# Patient Record
Sex: FEMALE | URBAN - METROPOLITAN AREA
[De-identification: names, ages, dates, MRNs, and addresses within clinical notes are randomized per-mention and may not be internally consistent; named-entity substitution may affect disease eponyms.]

---

## 2017-02-07 ENCOUNTER — INPATIENT (INPATIENT)
Facility: HOSPITAL | Age: 82
LOS: 9 days | Discharge: NURSING, REHAB OR LONG-TERM CARE | DRG: 871 | End: 2017-02-17
Attending: INTERNAL MEDICINE | Admitting: INTERNAL MEDICINE
Payer: MEDICARE

## 2017-02-07 VITALS
OXYGEN SATURATION: 96 % | SYSTOLIC BLOOD PRESSURE: 140 MMHG | HEART RATE: 84 BPM | TEMPERATURE: 98 F | RESPIRATION RATE: 14 BRPM | DIASTOLIC BLOOD PRESSURE: 60 MMHG

## 2017-02-07 DIAGNOSIS — F32.9 MAJOR DEPRESSIVE DISORDER, SINGLE EPISODE, UNSPECIFIED: ICD-10-CM

## 2017-02-07 DIAGNOSIS — K52.9 NONINFECTIVE GASTROENTERITIS AND COLITIS, UNSPECIFIED: ICD-10-CM

## 2017-02-07 DIAGNOSIS — N32.81 OVERACTIVE BLADDER: ICD-10-CM

## 2017-02-07 DIAGNOSIS — D72.829 ELEVATED WHITE BLOOD CELL COUNT, UNSPECIFIED: ICD-10-CM

## 2017-02-07 DIAGNOSIS — R82.90 UNSPECIFIED ABNORMAL FINDINGS IN URINE: ICD-10-CM

## 2017-02-07 DIAGNOSIS — J18.9 PNEUMONIA, UNSPECIFIED ORGANISM: ICD-10-CM

## 2017-02-07 DIAGNOSIS — R53.1 WEAKNESS: ICD-10-CM

## 2017-02-07 DIAGNOSIS — Z41.8 ENCOUNTER FOR OTHER PROCEDURES FOR PURPOSES OTHER THAN REMEDYING HEALTH STATE: ICD-10-CM

## 2017-02-07 DIAGNOSIS — R19.5 OTHER FECAL ABNORMALITIES: ICD-10-CM

## 2017-02-07 LAB
ALBUMIN SERPL ELPH-MCNC: 2.8 G/DL — LOW (ref 3.3–5)
ALP SERPL-CCNC: 57 U/L — SIGNIFICANT CHANGE UP (ref 40–120)
ALT FLD-CCNC: 9 U/L — LOW (ref 12–78)
ANION GAP SERPL CALC-SCNC: 9 MMOL/L — SIGNIFICANT CHANGE UP (ref 5–17)
ANISOCYTOSIS BLD QL: SLIGHT — SIGNIFICANT CHANGE UP
APPEARANCE UR: ABNORMAL
APTT BLD: 29.9 SEC — SIGNIFICANT CHANGE UP (ref 27.5–37.4)
AST SERPL-CCNC: 19 U/L — SIGNIFICANT CHANGE UP (ref 15–37)
BACTERIA # UR AUTO: ABNORMAL
BILIRUB SERPL-MCNC: 0.5 MG/DL — SIGNIFICANT CHANGE UP (ref 0.2–1.2)
BILIRUB UR-MCNC: NEGATIVE — SIGNIFICANT CHANGE UP
BUN SERPL-MCNC: 28 MG/DL — HIGH (ref 7–23)
CALCIUM SERPL-MCNC: 9.1 MG/DL — SIGNIFICANT CHANGE UP (ref 8.5–10.1)
CHLORIDE SERPL-SCNC: 102 MMOL/L — SIGNIFICANT CHANGE UP (ref 96–108)
CK MB BLD-MCNC: 1.4 % — SIGNIFICANT CHANGE UP (ref 0–3.5)
CK MB CFR SERPL CALC: 0.9 NG/ML — SIGNIFICANT CHANGE UP (ref 0–3.6)
CK SERPL-CCNC: 64 U/L — SIGNIFICANT CHANGE UP (ref 26–192)
CO2 SERPL-SCNC: 28 MMOL/L — SIGNIFICANT CHANGE UP (ref 22–31)
COLOR SPEC: YELLOW — SIGNIFICANT CHANGE UP
COMMENT - URINE: SIGNIFICANT CHANGE UP
CREAT SERPL-MCNC: 0.94 MG/DL — SIGNIFICANT CHANGE UP (ref 0.5–1.3)
DIFF PNL FLD: ABNORMAL
GLUCOSE SERPL-MCNC: 101 MG/DL — HIGH (ref 70–99)
GLUCOSE UR QL: NEGATIVE — SIGNIFICANT CHANGE UP
HCT VFR BLD CALC: 37.5 % — SIGNIFICANT CHANGE UP (ref 34.5–45)
HGB BLD-MCNC: 12.5 G/DL — SIGNIFICANT CHANGE UP (ref 11.5–15.5)
HYPOCHROMIA BLD QL: SLIGHT — SIGNIFICANT CHANGE UP
INR BLD: 1.37 RATIO — HIGH (ref 0.88–1.16)
KETONES UR-MCNC: NEGATIVE — SIGNIFICANT CHANGE UP
LACTATE SERPL-SCNC: 1.2 MMOL/L — SIGNIFICANT CHANGE UP (ref 0.7–2)
LEUKOCYTE ESTERASE UR-ACNC: ABNORMAL
LIDOCAIN IGE QN: 50 U/L — LOW (ref 73–393)
LYMPHOCYTES # BLD AUTO: 6 % — LOW (ref 13–44)
MACROCYTES BLD QL: SLIGHT — SIGNIFICANT CHANGE UP
MCHC RBC-ENTMCNC: 32 PG — SIGNIFICANT CHANGE UP (ref 27–34)
MCHC RBC-ENTMCNC: 33.4 GM/DL — SIGNIFICANT CHANGE UP (ref 32–36)
MCV RBC AUTO: 95.9 FL — SIGNIFICANT CHANGE UP (ref 80–100)
MONOCYTES NFR BLD AUTO: 4 % — SIGNIFICANT CHANGE UP (ref 1–9)
NEUTROPHILS NFR BLD AUTO: 63 % — SIGNIFICANT CHANGE UP (ref 43–77)
NEUTS BAND # BLD: 25 % — HIGH (ref 0–8)
NITRITE UR-MCNC: POSITIVE
OB PNL STL: POSITIVE
PH UR: 6 — SIGNIFICANT CHANGE UP (ref 4.8–8)
PLAT MORPH BLD: NORMAL — SIGNIFICANT CHANGE UP
PLATELET # BLD AUTO: 213 K/UL — SIGNIFICANT CHANGE UP (ref 150–400)
POIKILOCYTOSIS BLD QL AUTO: SLIGHT — SIGNIFICANT CHANGE UP
POLYCHROMASIA BLD QL SMEAR: SLIGHT — SIGNIFICANT CHANGE UP
POTASSIUM SERPL-MCNC: 3.5 MMOL/L — SIGNIFICANT CHANGE UP (ref 3.5–5.3)
POTASSIUM SERPL-SCNC: 3.5 MMOL/L — SIGNIFICANT CHANGE UP (ref 3.5–5.3)
PROT SERPL-MCNC: 7 G/DL — SIGNIFICANT CHANGE UP (ref 6–8.3)
PROT UR-MCNC: NEGATIVE — SIGNIFICANT CHANGE UP
PROTHROM AB SERPL-ACNC: 15.3 SEC — HIGH (ref 10–13.1)
RBC # BLD: 3.91 M/UL — SIGNIFICANT CHANGE UP (ref 3.8–5.2)
RBC # FLD: 13.3 % — SIGNIFICANT CHANGE UP (ref 10.3–14.5)
RBC BLD AUTO: ABNORMAL
RBC CASTS # UR COMP ASSIST: SIGNIFICANT CHANGE UP /HPF (ref 0–4)
SODIUM SERPL-SCNC: 139 MMOL/L — SIGNIFICANT CHANGE UP (ref 135–145)
SP GR SPEC: 1.01 — SIGNIFICANT CHANGE UP (ref 1.01–1.02)
TROPONIN I SERPL-MCNC: 0.02 NG/ML — SIGNIFICANT CHANGE UP (ref 0.01–0.04)
UROBILINOGEN FLD QL: NEGATIVE — SIGNIFICANT CHANGE UP
VARIANT LYMPHS # BLD: 2 % — SIGNIFICANT CHANGE UP (ref 0–6)
WBC # BLD: 18.8 K/UL — HIGH (ref 3.8–10.5)
WBC # FLD AUTO: 18.8 K/UL — HIGH (ref 3.8–10.5)
WBC UR QL: SIGNIFICANT CHANGE UP

## 2017-02-07 PROCEDURE — 99285 EMERGENCY DEPT VISIT HI MDM: CPT

## 2017-02-07 PROCEDURE — 70450 CT HEAD/BRAIN W/O DYE: CPT | Mod: 26

## 2017-02-07 PROCEDURE — 74177 CT ABD & PELVIS W/CONTRAST: CPT | Mod: 26

## 2017-02-07 PROCEDURE — 71010: CPT | Mod: 26

## 2017-02-07 PROCEDURE — 99223 1ST HOSP IP/OBS HIGH 75: CPT | Mod: AI,GC

## 2017-02-07 PROCEDURE — 93010 ELECTROCARDIOGRAM REPORT: CPT

## 2017-02-07 RX ORDER — ACETAMINOPHEN 500 MG
650 TABLET ORAL EVERY 6 HOURS
Qty: 0 | Refills: 0 | Status: DISCONTINUED | OUTPATIENT
Start: 2017-02-07 | End: 2017-02-17

## 2017-02-07 RX ORDER — SODIUM CHLORIDE 9 MG/ML
1000 INJECTION INTRAMUSCULAR; INTRAVENOUS; SUBCUTANEOUS
Qty: 0 | Refills: 0 | Status: DISCONTINUED | OUTPATIENT
Start: 2017-02-07 | End: 2017-02-07

## 2017-02-07 RX ORDER — SODIUM CHLORIDE 9 MG/ML
1000 INJECTION INTRAMUSCULAR; INTRAVENOUS; SUBCUTANEOUS
Qty: 0 | Refills: 0 | Status: DISCONTINUED | OUTPATIENT
Start: 2017-02-07 | End: 2017-02-08

## 2017-02-07 RX ORDER — AZITHROMYCIN 500 MG/1
500 TABLET, FILM COATED ORAL EVERY 24 HOURS
Qty: 0 | Refills: 0 | Status: DISCONTINUED | OUTPATIENT
Start: 2017-02-07 | End: 2017-02-07

## 2017-02-07 RX ORDER — PIPERACILLIN AND TAZOBACTAM 4; .5 G/20ML; G/20ML
3.38 INJECTION, POWDER, LYOPHILIZED, FOR SOLUTION INTRAVENOUS EVERY 8 HOURS
Qty: 0 | Refills: 0 | Status: DISCONTINUED | OUTPATIENT
Start: 2017-02-07 | End: 2017-02-07

## 2017-02-07 RX ORDER — SODIUM CHLORIDE 9 MG/ML
1000 INJECTION INTRAMUSCULAR; INTRAVENOUS; SUBCUTANEOUS ONCE
Qty: 0 | Refills: 0 | Status: COMPLETED | OUTPATIENT
Start: 2017-02-07 | End: 2017-02-07

## 2017-02-07 RX ORDER — AZITHROMYCIN 500 MG/1
500 TABLET, FILM COATED ORAL ONCE
Qty: 0 | Refills: 0 | Status: COMPLETED | OUTPATIENT
Start: 2017-02-07 | End: 2017-02-07

## 2017-02-07 RX ORDER — ASPIRIN/CALCIUM CARB/MAGNESIUM 324 MG
0 TABLET ORAL
Qty: 0 | Refills: 0 | COMMUNITY

## 2017-02-07 RX ORDER — ONDANSETRON 8 MG/1
4 TABLET, FILM COATED ORAL EVERY 6 HOURS
Qty: 0 | Refills: 0 | Status: DISCONTINUED | OUTPATIENT
Start: 2017-02-07 | End: 2017-02-17

## 2017-02-07 RX ORDER — PIPERACILLIN AND TAZOBACTAM 4; .5 G/20ML; G/20ML
3.38 INJECTION, POWDER, LYOPHILIZED, FOR SOLUTION INTRAVENOUS ONCE
Qty: 0 | Refills: 0 | Status: COMPLETED | OUTPATIENT
Start: 2017-02-07 | End: 2017-02-07

## 2017-02-07 RX ORDER — METRONIDAZOLE 500 MG
500 TABLET ORAL ONCE
Qty: 0 | Refills: 0 | Status: COMPLETED | OUTPATIENT
Start: 2017-02-07 | End: 2017-02-07

## 2017-02-07 RX ORDER — ENOXAPARIN SODIUM 100 MG/ML
40 INJECTION SUBCUTANEOUS EVERY 24 HOURS
Qty: 0 | Refills: 0 | Status: DISCONTINUED | OUTPATIENT
Start: 2017-02-07 | End: 2017-02-08

## 2017-02-07 RX ORDER — SODIUM CHLORIDE 9 MG/ML
3 INJECTION INTRAMUSCULAR; INTRAVENOUS; SUBCUTANEOUS ONCE
Qty: 0 | Refills: 0 | Status: COMPLETED | OUTPATIENT
Start: 2017-02-07 | End: 2017-02-07

## 2017-02-07 RX ORDER — METRONIDAZOLE 500 MG
500 TABLET ORAL EVERY 8 HOURS
Qty: 0 | Refills: 0 | Status: DISCONTINUED | OUTPATIENT
Start: 2017-02-07 | End: 2017-02-12

## 2017-02-07 RX ORDER — PANTOPRAZOLE SODIUM 20 MG/1
40 TABLET, DELAYED RELEASE ORAL EVERY 12 HOURS
Qty: 0 | Refills: 0 | Status: DISCONTINUED | OUTPATIENT
Start: 2017-02-07 | End: 2017-02-17

## 2017-02-07 RX ORDER — CEFTRIAXONE 500 MG/1
1 INJECTION, POWDER, FOR SOLUTION INTRAMUSCULAR; INTRAVENOUS ONCE
Qty: 0 | Refills: 0 | Status: COMPLETED | OUTPATIENT
Start: 2017-02-07 | End: 2017-02-07

## 2017-02-07 RX ORDER — ACETAMINOPHEN 500 MG
650 TABLET ORAL ONCE
Qty: 0 | Refills: 0 | Status: COMPLETED | OUTPATIENT
Start: 2017-02-07 | End: 2017-02-07

## 2017-02-07 RX ADMIN — Medication 100 MILLIGRAM(S): at 19:53

## 2017-02-07 RX ADMIN — PIPERACILLIN AND TAZOBACTAM 200 GRAM(S): 4; .5 INJECTION, POWDER, LYOPHILIZED, FOR SOLUTION INTRAVENOUS at 17:00

## 2017-02-07 RX ADMIN — Medication 650 MILLIGRAM(S): at 13:01

## 2017-02-07 RX ADMIN — ENOXAPARIN SODIUM 40 MILLIGRAM(S): 100 INJECTION SUBCUTANEOUS at 19:52

## 2017-02-07 RX ADMIN — SODIUM CHLORIDE 100 MILLILITER(S): 9 INJECTION INTRAMUSCULAR; INTRAVENOUS; SUBCUTANEOUS at 22:45

## 2017-02-07 RX ADMIN — SODIUM CHLORIDE 125 MILLILITER(S): 9 INJECTION INTRAMUSCULAR; INTRAVENOUS; SUBCUTANEOUS at 13:01

## 2017-02-07 RX ADMIN — SODIUM CHLORIDE 3 MILLILITER(S): 9 INJECTION INTRAMUSCULAR; INTRAVENOUS; SUBCUTANEOUS at 12:24

## 2017-02-07 RX ADMIN — CEFTRIAXONE 100 GRAM(S): 500 INJECTION, POWDER, FOR SOLUTION INTRAMUSCULAR; INTRAVENOUS at 13:01

## 2017-02-07 RX ADMIN — SODIUM CHLORIDE 1000 MILLILITER(S): 9 INJECTION INTRAMUSCULAR; INTRAVENOUS; SUBCUTANEOUS at 11:35

## 2017-02-07 RX ADMIN — AZITHROMYCIN 255 MILLIGRAM(S): 500 TABLET, FILM COATED ORAL at 18:22

## 2017-02-07 NOTE — H&P ADULT. - ATTENDING COMMENTS
cc lower abdo pain with loose stools x 1 day   HPI (Essential HTN in the past, off HTN meds given hypotension, Hx of TIA no CVA, OA on alleve, depression)   88 yo female with one day of lower abdo pain (discomfort) with loose stools dark watery 3-4 times with large amount prior to ED arrival.   pain not radiating to the back, no flank pain no dyuria no urgency no frequency. no recent weight loss, no change in po dit intake, no pain with eating, pain relieves eases up, but then has episodes of explosive loose stools.     no recent sick contacts, no travel, no take out, no colonscopy, no hx of diverticulitis, no colon ca, no hx of radiation   no hx of recent abx use. not on immunesuppresive agents   patient does use alleve daily for OA.   no chest pain no sob, no cough no CAREY no le edema no PND no orthopnea  no hx of MI, PVD, Stroke, no IDDM II.   CT ap done in the ED found to have SB enteritis, with fever 101, and leukocytosis 18 with bandemia.   given iv empiric broad spectrum abx and IV fluids.   PMH, PSH, Social, fam hx, med list reviewed.   ICU Vital Signs Last 24 Hrs  T(C): 36.8, Max: 38.3 (02-07 @ 10:35)  T(F): 98.2, Max: 101 (02-07 @ 10:35)  HR: 80 (80 - 94)  BP: 132/68 (128/66 - 140/60)  BP(mean): --  ABP: --  ABP(mean): --  RR: 16 (14 - 20)  SpO2: 96% (95% - 96%)  Gen looks well a xo x 3, speaking full sentences   heent no lo   pulm good ae bi, no wheezing bi   cvs rrr s1-s2 no murmurs no edema le bi, no jvd   abdo soft, reduced BS, mild ttp at the lower abdo, mid > lt/rt nd no flank pain   labs reviewed and analyzed   prior medical hx reviewed and analyzed.   CT ap imaging reports reviewed and personally viewed no bowel obstruction (IV non contrast, PO contrast noted)   CXR reports reviewed and personally viewed.  FOBT (+)   A/P:   Signs and symptoms consistent with acute infectious diarrhea SB enteritis with ? dark blood, viral rotavirus, adenovirus, CMV etc, r/o bacterial shigella, Campylobacter, less likely salmonella, r/o c diff   ddx NSAID induced enteropathy patient is on alleve and takes daily for OA   DDX less likely mesenteric ischemia.   NPO   IV fluids NS 100cc/hr x 1 liter then reassess clincially.  start PPI as well given NSAID hx   start Levaquin and Flagyl IV   If not improved, consider GI consultation for egd, colonoscopy, DBE and or VCE   High risk for morbidity, mortality, secondary to the above mentioned co-existing co-morbid medical conditions   reviewed with patient and dtr plan of care cc lower abdo pain with loose stools x 1 day   HPI (Essential HTN in the past, off HTN meds given hypotension, Hx of TIA no CVA, OA on alleve, depression)   86 yo female with one day of lower abdo pain (discomfort) with loose stools dark watery 3-4 times with large amount prior to ED arrival.   pain not radiating to the back, no flank pain no dyuria no urgency no frequency. no recent weight loss, no change in po dit intake, no pain with eating, pain relieves eases up, but then has episodes of explosive loose stools.     no recent sick contacts, no travel, no take out, no colonscopy, no hx of diverticulitis, no colon ca, no hx of radiation   no hx of recent abx use. not on immunesuppresive agents, no Hx of IBD    patient does use alleve daily for OA.   no chest pain no sob, no cough no CAREY no le edema no PND no orthopnea  no hx of MI, PVD, Stroke, no IDDM II.   CT ap done in the ED found to have SB enteritis, with fever 101, and leukocytosis 18 with bandemia.   given iv empiric broad spectrum abx and IV fluids.   PMH, PSH, Social, fam hx, med list reviewed.   ICU Vital Signs Last 24 Hrs  T(C): 36.8, Max: 38.3 (02-07 @ 10:35)  T(F): 98.2, Max: 101 (02-07 @ 10:35)  HR: 80 (80 - 94)  BP: 132/68 (128/66 - 140/60)  BP(mean): --  ABP: --  ABP(mean): --  RR: 16 (14 - 20)  SpO2: 96% (95% - 96%)  Gen looks well a xo x 3, speaking full sentences   heent no lo   pulm good ae bi, no wheezing bi   cvs rrr s1-s2 no murmurs no edema le bi, no jvd   abdo soft, reduced BS, mild ttp at the lower abdo, mid > lt/rt nd no flank pain   labs reviewed and analyzed   prior medical hx reviewed and analyzed.   CT ap imaging reports reviewed and personally viewed no bowel obstruction (IV non contrast, PO contrast noted)   CXR reports reviewed and personally viewed.  FOBT (+)   A/P:   Signs and symptoms consistent with acute infectious diarrhea SB enteritis with ? dark blood, viral rotavirus, adenovirus, CMV etc, r/o bacterial shigella, Campylobacter, less likely salmonella, r/o c diff   ddx NSAID induced enteropathy patient is on alleve and takes daily for OA   DDX less likely mesenteric ischemia.   NPO   IV fluids NS 100cc/hr x 1 liter then reassess clincially.  start PPI as well given NSAID hx   start Levaquin and Flagyl IV   If not improved, consider GI consultation for egd, colonoscopy, DBE and or VCE   High risk for morbidity, mortality, secondary to the above mentioned co-existing co-morbid medical conditions   reviewed with patient and dtr plan of care cc lower abdo pain with loose stools x 1 day   HPI (Essential HTN in the past, off HTN meds given hypotension, Hx of TIA no CVA, OA on alleve, depression)   86 yo female with one day of lower abdo pain (discomfort) with loose stools dark watery 3-4 times with large amount prior to ED arrival.   pain not radiating to the back, no flank pain no dyuria no urgency no frequency. no recent weight loss, no change in po dit intake, no pain with eating, pain relieves eases up, but then has episodes of explosive loose stools.     no recent sick contacts, no travel, no take out, no colonscopy, no hx of diverticulitis, no colon ca, no hx of radiation   no hx of recent abx use. not on immunesuppresive agents, no Hx of IBD    patient does use alleve daily for OA.   no chest pain no sob, no cough no CAREY no le edema no PND no orthopnea  no hx of MI, PVD, Stroke, no IDDM II.   CT ap done in the ED found to have SB enteritis, with fever 101, and leukocytosis 18 with bandemia.   given iv empiric broad spectrum abx and IV fluids.   PMH, PSH, Social, fam hx, med list reviewed.   ICU Vital Signs Last 24 Hrs  T(C): 36.8, Max: 38.3 (02-07 @ 10:35)  T(F): 98.2, Max: 101 (02-07 @ 10:35)  HR: 80 (80 - 94)  BP: 132/68 (128/66 - 140/60)  BP(mean): --  ABP: --  ABP(mean): --  RR: 16 (14 - 20)  SpO2: 96% (95% - 96%)  Gen looks well a xo x 3, speaking full sentences   heent no lo   pulm good ae bi, no wheezing bi   cvs rrr s1-s2 no murmurs no edema le bi, no jvd   abdo soft, reduced BS, mild ttp at the lower abdo, mid > lt/rt nd no flank pain   labs reviewed and analyzed   prior medical hx reviewed and analyzed.   CT ap imaging reports reviewed and personally viewed no bowel obstruction (IV non contrast, PO contrast noted)   CXR reports reviewed and personally viewed.  FOBT (+)   A/P:   Signs and symptoms consistent with acute infectious diarrhea SB enteritis with ? dark blood, viral rotavirus, adenovirus, CMV etc, r/o bacterial shigella, Campylobacter, less likely salmonella, r/o c diff   ddx NSAID induced enteropathy patient is on alleve and takes daily for OA   DDX less likely mesenteric ischemia.   NPO   IV fluids NS 100cc/hr x 1 liter then reassess clincially.  start PPI as well given NSAID hx   Avoid NSAID use  start Levaquin and Flagyl IV   If not improved, consider GI consultation for egd, colonoscopy, DBE and or VCE   High risk for morbidity, mortality, secondary to the above mentioned co-existing co-morbid medical conditions   reviewed with patient and dtr plan of care cc lower abdo pain with loose stools x 1 day   HPI (Essential HTN in the past, off HTN meds given hypotension, Hx of TIA no CVA, OA on alleve, depression)   88 yo female with one day of lower abdo pain (discomfort) with loose stools dark watery 3-4 times with large amount prior to ED arrival.   pain not radiating to the back, no flank pain no dyuria no urgency no frequency. no recent weight loss, no change in po dit intake, no pain with eating, pain relieves eases up, but then has episodes of explosive loose stools.     no recent sick contacts, no travel, no take out, no colonscopy, no hx of diverticulitis, no colon ca, no hx of radiation   no hx of recent abx use. not on immunesuppresive agents, no Hx of IBD    patient does use alleve daily for OA.   no chest pain no sob, no cough no CAREY no le edema no PND no orthopnea  no hx of MI, PVD, Stroke, no IDDM II.   CT ap done in the ED found to have SB enteritis, with fever 101, and leukocytosis 18 with bandemia.   given iv empiric broad spectrum abx and IV fluids.   PMH, PSH, Social, fam hx, med list reviewed.   ICU Vital Signs Last 24 Hrs  T(C): 36.8, Max: 38.3 (02-07 @ 10:35)  T(F): 98.2, Max: 101 (02-07 @ 10:35)  HR: 80 (80 - 94)  BP: 132/68 (128/66 - 140/60)  BP(mean): --  ABP: --  ABP(mean): --  RR: 16 (14 - 20)  SpO2: 96% (95% - 96%)  Gen looks well a xo x 3, speaking full sentences   heent no lo   pulm good ae bi, no wheezing bi   cvs rrr s1-s2 no murmurs no edema le bi, no jvd   abdo soft, reduced BS, mild ttp at the lower abdo, mid > lt/rt nd no flank pain   labs reviewed and analyzed   prior medical hx reviewed and analyzed.   CT ap imaging reports reviewed and personally viewed no bowel obstruction (IV non contrast, PO contrast noted)   CXR reports reviewed and personally viewed.  FOBT (+)   A/P:   Signs and symptoms consistent with acute infectious diarrhea SB enteritis with ? dark blood, viral rotavirus, adenovirus, CMV etc, r/o bacterial shigella, Campylobacter, less likely salmonella, r/o c diff   ddx NSAID induced enteropathy patient is on alleve and takes daily for OA   DDX less likely mesenteric ischemia.   NPO   IV fluids NS 100cc/hr x 1 liter then reassess clincially.  start PPI Protonix 40 mg IV bid -given NSAID hx   Avoid NSAID use  start Levaquin and Flagyl IV   If not improved, consider GI consultation for egd, colonoscopy, DBE and or VCE   High risk for morbidity, mortality, secondary to the above mentioned co-existing co-morbid medical conditions   reviewed with patient and dtr plan of care cc lower abdo pain with loose stools x 1 day   HPI (Essential HTN in the past, off HTN meds given hypotension, Hx of TIA no CVA, OA on alleve, depression)   86 yo female with one day of lower abdo pain (discomfort) with loose stools dark watery 3-4 times with large amount prior to ED arrival.   pain not radiating to the back, no flank pain no dyuria no urgency no frequency. no recent weight loss, no change in po dit intake, no pain with eating, pain relieves eases up, but then has episodes of explosive loose stools.     no recent sick contacts, no travel, no take out, no colonscopy, no hx of diverticulitis, no colon ca, no hx of radiation   no hx of recent abx use. not on immunesuppresive agents, no Hx of IBD    patient does use alleve daily for OA.   no chest pain no sob, no cough no CAREY no le edema no PND no orthopnea  no hx of MI, PVD, Stroke, no IDDM II.   CT ap done in the ED found to have SB enteritis, with fever 101, and leukocytosis 18 with bandemia.   given iv empiric broad spectrum abx and IV fluids.   PMH, PSH, Social, fam hx, med list reviewed.   ICU Vital Signs Last 24 Hrs  T(C): 36.8, Max: 38.3 (02-07 @ 10:35)  T(F): 98.2, Max: 101 (02-07 @ 10:35)  HR: 80 (80 - 94)  BP: 132/68 (128/66 - 140/60)  BP(mean): --  ABP: --  ABP(mean): --  RR: 16 (14 - 20)  SpO2: 96% (95% - 96%)  Gen looks well a xo x 3, speaking full sentences   heent no lo   pulm good ae bi, no wheezing bi   cvs rrr s1-s2 no murmurs no edema le bi, no jvd   abdo soft, reduced BS, mild ttp at the lower abdo, mid > lt/rt nd no flank pain   labs reviewed and analyzed   prior medical hx reviewed and analyzed.   CT ap imaging reports reviewed and personally viewed no bowel obstruction (IV non contrast, PO contrast noted)   CXR reports reviewed and personally viewed.  FOBT (+)   A/P:   Signs and symptoms consistent with acute infectious diarrhea SB enteritis with ? dark blood, viral rotavirus, adenovirus, CMV etc, r/o bacterial shigella, Campylobacter, less likely salmonella, r/o c diff   ddx NSAID induced enteropathy patient is on alleve and takes daily for OA   DDX less likely mesenteric ischemia.   NPO   IV fluids NS 100cc/hr x 1 liter then reassess clincially.  start PPI Protonix 40 mg IV bid -given NSAID hx   Avoid NSAID use  start Levaquin and Flagyl IV   If not improved, consider GI consultation for egd, colonoscopy, DBE and or VCE   (not likely PNA, UTI clinically, will monitor clinically over time)   High risk for morbidity, mortality, secondary to the above mentioned co-existing co-morbid medical conditions   reviewed with patient and dtr plan of care

## 2017-02-07 NOTE — ED PROVIDER NOTE - OBJECTIVE STATEMENT
Pt is a 88 yo f who lives at home with children. hx of pelvic fx freq falls heavy smoker tia no surgery pmd =[facility]. was in baseline health until yest when she had explosive diarrhea. today she was very weak and could not get oob. was even hard to speak. lkw was 6pm last matthew (tpa discussion held with pt and family) this am pt had red urine could not sit up so daughter called 911 to bring pt to er.

## 2017-02-07 NOTE — H&P ADULT. - ASSESSMENT
87F PMHx depression, over active bladder, COPD (not on maintenance meds), HTN (no longer needing meds), alternating constipation and diarrhea admitted for weakness and colitis with ?PNA and ?UTI.

## 2017-02-07 NOTE — H&P ADULT. - PROBLEM SELECTOR PLAN 5
-Reported 1 bloody BM. No current signs of active bleeding.   -If no symptomatic improvement consider GI consult for possible NSAID induced enteritis.   -F/u T&S

## 2017-02-07 NOTE — H&P ADULT. - PMH
COPD (chronic obstructive pulmonary disease)    Depression, unspecified depression type    H/O TIA (transient ischemic attack) and stroke  ? history of  OAB (overactive bladder)

## 2017-02-07 NOTE — H&P ADULT. - PROBLEM SELECTOR PLAN 6
Likely 2/2 #1, 2 and 5.   -Continue Antibiotics  -Monitor CBCs.  -Out of Bed with assistance  -Fall precautions

## 2017-02-07 NOTE — ED ADULT NURSE REASSESSMENT NOTE - NS ED NURSE REASSESS COMMENT FT1
await room cleaning, changed for contact precautions r/o c-diff.
pt straight cath'd for ua, c/s. pt taken for CT

## 2017-02-07 NOTE — H&P ADULT. - RS GEN PE MLT RESP DETAILS PC
respirations non-labored/breath sounds equal/no rhonchi/good air movement/no rales/coarse breath sounds diffusely/no wheezes

## 2017-02-07 NOTE — PATIENT PROFILE ADULT. - REASON FOR ADMISSION
Patient came TO ER due to being unable to get OOB and having diarrhea since yesturday and pain in abdomen

## 2017-02-07 NOTE — H&P ADULT. - PROBLEM SELECTOR PLAN 1
-Continue Flagyl and Levaquin. -  NS @ 100  -F/u cultures, stool studies (ova, parasites, C. Diff)  -Contact precautions until stool studies resulted.   -Tylenol PRN  -Clear Liquid diet

## 2017-02-07 NOTE — H&P ADULT. - HISTORY OF PRESENT ILLNESS
87F PMHx depression, over active bladder, COPD (not on maintenance meds), HTN (no longer needing meds), alternating constipation and diarrhea presenting with weakness and abdominal pain.  History obtained from daughter and patient. Pt had not been feeling herself for the past 3 days, more tired and weak with a hoarse voice c/o crampy abdominal and back pain. Pt had multiple episodes of diarrhea day prior to admission, too numerous to count, pt was incontinent of stool, 1 episode of blood in stool, but all others brown without blood. Pt took unknown anti-diarrheal medication x3, diarrhea stopped. When patient awoke this morning she was too weak to get out of bed so daughter took her to the ER. Admits to decreased appetite over the past few days. Denies fever, new cough, sore throat, nausea, vomiting, sick contacts, SOB or chest pain.     In ED T 101 87F PMHx depression, over active bladder, COPD (not on maintenance meds), HTN (no longer needing meds), alternating constipation and diarrhea presenting with weakness and abdominal pain.  History obtained from daughter and patient. Pt had not been feeling herself for the past 3 days, more tired and weak with a hoarse voice c/o crampy abdominal and back pain. Pt had multiple episodes of diarrhea day prior to admission, too numerous to count, pt was incontinent of stool, 1 episode of blood in stool, but all others brown without blood. Pt took unknown anti-diarrheal medication x3, diarrhea stopped. When patient awoke this morning she was too weak to get out of bed so daughter took her to the ER. Admits to decreased appetite over the past few days. Denies fever, new cough, sore throat, nausea, vomiting, sick contacts, SOB or chest pain.     In ED T 101F, WBC 18, 20% bands. UA +leuk esterase, +nitrite, many bacteria. FOBT+.   CXR: Asymmetric increased density seen throughout the left hemithorax compatible with left-sided airspace disease, possibly PNA.   CT A/P: Enteritis infectious vs inflammatory, Dependent B/L LL airspace disease with superimposed nodular left lower lobe opacities, possible PNA. Possible infectious or inflammatory cystitis. Nodular hepatic surface requiring further evaluation for cirrhosis. Severe compression deformity of L1 vertebral body, age indeterminate.   Multilevel disc osteophyte complexes.   CT Head: negative for acute bleed    Pt admitted for weakness, with colitis and ?PNA and ?UTI. 87F PMHx depression, over active bladder, COPD (not on maintenance meds), HTN (no longer needing meds), alternating constipation and diarrhea presenting with weakness and abdominal pain.  History obtained from daughter and patient. Pt had not been feeling herself for the past 3 days, more tired and weak with a hoarse voice c/o crampy abdominal and back pain. Pt had multiple episodes of diarrhea day prior to admission, too numerous to count, pt was incontinent of stool, 1 episode of blood in stool, but all others brown without blood. Pt took unknown anti-diarrheal medication x3, diarrhea stopped. When patient awoke this morning she was too weak to get out of bed so daughter took her to the ER. Admits to decreased appetite over the past few days. Denies fever, new cough, sore throat, nausea, vomiting, sick contacts, SOB or chest pain.     In ED T 101F, WBC 18, 20% bands. UA +leuk esterase, +nitrite, many bacteria. FOBT+.    CXR: Asymmetric increased density seen throughout the left hemithorax compatible with left-sided airspace disease, possibly PNA.   CT A/P: Enteritis infectious vs inflammatory, Dependent B/L LL airspace disease with superimposed nodular left lower lobe opacities, possible PNA. Possible infectious or inflammatory cystitis. Nodular hepatic surface requiring further evaluation for cirrhosis. Severe compression deformity of L1 vertebral body, age indeterminate.   Multilevel disc osteophyte complexes.   CT Head: negative for acute bleed  Received 1 dose of Rocephin.     Pt admitted for weakness, with colitis and ?PNA and ?UTI.

## 2017-02-08 LAB
ALBUMIN SERPL ELPH-MCNC: 2.3 G/DL — LOW (ref 3.3–5)
ALP SERPL-CCNC: 62 U/L — SIGNIFICANT CHANGE UP (ref 40–120)
ALT FLD-CCNC: 10 U/L — LOW (ref 12–78)
ANION GAP SERPL CALC-SCNC: 10 MMOL/L — SIGNIFICANT CHANGE UP (ref 5–17)
AST SERPL-CCNC: 30 U/L — SIGNIFICANT CHANGE UP (ref 15–37)
BASOPHILS # BLD AUTO: 0.1 K/UL — SIGNIFICANT CHANGE UP (ref 0–0.2)
BASOPHILS NFR BLD AUTO: 0.3 % — SIGNIFICANT CHANGE UP (ref 0–2)
BILIRUB SERPL-MCNC: 0.4 MG/DL — SIGNIFICANT CHANGE UP (ref 0.2–1.2)
BUN SERPL-MCNC: 22 MG/DL — SIGNIFICANT CHANGE UP (ref 7–23)
CALCIUM SERPL-MCNC: 8.4 MG/DL — LOW (ref 8.5–10.1)
CHLORIDE SERPL-SCNC: 109 MMOL/L — HIGH (ref 96–108)
CO2 SERPL-SCNC: 24 MMOL/L — SIGNIFICANT CHANGE UP (ref 22–31)
CREAT SERPL-MCNC: 0.74 MG/DL — SIGNIFICANT CHANGE UP (ref 0.5–1.3)
EOSINOPHIL # BLD AUTO: 0.2 K/UL — SIGNIFICANT CHANGE UP (ref 0–0.5)
EOSINOPHIL NFR BLD AUTO: 1.1 % — SIGNIFICANT CHANGE UP (ref 0–6)
GLUCOSE SERPL-MCNC: 101 MG/DL — HIGH (ref 70–99)
HCT VFR BLD CALC: 32.6 % — LOW (ref 34.5–45)
HCT VFR BLD CALC: 33.9 % — LOW (ref 34.5–45)
HGB BLD-MCNC: 10.6 G/DL — LOW (ref 11.5–15.5)
HGB BLD-MCNC: 10.8 G/DL — LOW (ref 11.5–15.5)
LYMPHOCYTES # BLD AUTO: 1.1 K/UL — SIGNIFICANT CHANGE UP (ref 1–3.3)
LYMPHOCYTES # BLD AUTO: 6.6 % — LOW (ref 13–44)
MCHC RBC-ENTMCNC: 30.9 PG — SIGNIFICANT CHANGE UP (ref 27–34)
MCHC RBC-ENTMCNC: 32 GM/DL — SIGNIFICANT CHANGE UP (ref 32–36)
MCV RBC AUTO: 96.6 FL — SIGNIFICANT CHANGE UP (ref 80–100)
MONOCYTES # BLD AUTO: 1.1 K/UL — HIGH (ref 0–0.9)
MONOCYTES NFR BLD AUTO: 6.3 % — SIGNIFICANT CHANGE UP (ref 1–9)
NEUTROPHILS # BLD AUTO: 14.7 K/UL — HIGH (ref 1.8–7.4)
NEUTROPHILS NFR BLD AUTO: 85.6 % — HIGH (ref 43–77)
PLATELET # BLD AUTO: 177 K/UL — SIGNIFICANT CHANGE UP (ref 150–400)
POTASSIUM SERPL-MCNC: 3.2 MMOL/L — LOW (ref 3.5–5.3)
POTASSIUM SERPL-SCNC: 3.2 MMOL/L — LOW (ref 3.5–5.3)
PROT SERPL-MCNC: 5.9 G/DL — LOW (ref 6–8.3)
RBC # BLD: 3.51 M/UL — LOW (ref 3.8–5.2)
RBC # FLD: 13.2 % — SIGNIFICANT CHANGE UP (ref 10.3–14.5)
SODIUM SERPL-SCNC: 143 MMOL/L — SIGNIFICANT CHANGE UP (ref 135–145)
WBC # BLD: 17.1 K/UL — HIGH (ref 3.8–10.5)
WBC # FLD AUTO: 17.1 K/UL — HIGH (ref 3.8–10.5)

## 2017-02-08 PROCEDURE — 99233 SBSQ HOSP IP/OBS HIGH 50: CPT | Mod: GC

## 2017-02-08 RX ORDER — POTASSIUM CHLORIDE 20 MEQ
40 PACKET (EA) ORAL EVERY 4 HOURS
Qty: 0 | Refills: 0 | Status: COMPLETED | OUTPATIENT
Start: 2017-02-08 | End: 2017-02-08

## 2017-02-08 RX ORDER — SODIUM CHLORIDE 9 MG/ML
1000 INJECTION INTRAMUSCULAR; INTRAVENOUS; SUBCUTANEOUS
Qty: 0 | Refills: 0 | Status: DISCONTINUED | OUTPATIENT
Start: 2017-02-08 | End: 2017-02-09

## 2017-02-08 RX ORDER — CIPROFLOXACIN LACTATE 400MG/40ML
400 VIAL (ML) INTRAVENOUS EVERY 12 HOURS
Qty: 0 | Refills: 0 | Status: DISCONTINUED | OUTPATIENT
Start: 2017-02-08 | End: 2017-02-12

## 2017-02-08 RX ORDER — NICOTINE POLACRILEX 2 MG
1 GUM BUCCAL DAILY
Qty: 0 | Refills: 0 | Status: DISCONTINUED | OUTPATIENT
Start: 2017-02-08 | End: 2017-02-08

## 2017-02-08 RX ORDER — OXYBUTYNIN CHLORIDE 5 MG
5 TABLET ORAL
Qty: 0 | Refills: 0 | Status: DISCONTINUED | OUTPATIENT
Start: 2017-02-08 | End: 2017-02-17

## 2017-02-08 RX ORDER — LACTOBACILLUS ACIDOPHILUS 100MM CELL
1 CAPSULE ORAL
Qty: 0 | Refills: 0 | Status: DISCONTINUED | OUTPATIENT
Start: 2017-02-08 | End: 2017-02-17

## 2017-02-08 RX ORDER — OXYBUTYNIN CHLORIDE 5 MG
5 TABLET ORAL
Qty: 0 | Refills: 0 | Status: DISCONTINUED | OUTPATIENT
Start: 2017-02-08 | End: 2017-02-08

## 2017-02-08 RX ORDER — NICOTINE POLACRILEX 2 MG
1 GUM BUCCAL DAILY
Qty: 0 | Refills: 0 | Status: DISCONTINUED | OUTPATIENT
Start: 2017-02-08 | End: 2017-02-09

## 2017-02-08 RX ADMIN — Medication 100 MILLIGRAM(S): at 01:15

## 2017-02-08 RX ADMIN — Medication 40 MILLIEQUIVALENT(S): at 12:09

## 2017-02-08 RX ADMIN — PANTOPRAZOLE SODIUM 40 MILLIGRAM(S): 20 TABLET, DELAYED RELEASE ORAL at 17:19

## 2017-02-08 RX ADMIN — Medication 20 MILLIGRAM(S): at 12:11

## 2017-02-08 RX ADMIN — PANTOPRAZOLE SODIUM 40 MILLIGRAM(S): 20 TABLET, DELAYED RELEASE ORAL at 06:51

## 2017-02-08 RX ADMIN — Medication 200 MILLIGRAM(S): at 17:19

## 2017-02-08 RX ADMIN — Medication 5 MILLIGRAM(S): at 12:11

## 2017-02-08 RX ADMIN — Medication 100 MILLIGRAM(S): at 08:43

## 2017-02-08 RX ADMIN — Medication 40 MILLIEQUIVALENT(S): at 08:43

## 2017-02-08 RX ADMIN — Medication 5 MILLIGRAM(S): at 17:19

## 2017-02-08 RX ADMIN — Medication 1 PATCH: at 12:12

## 2017-02-08 RX ADMIN — Medication 1 TABLET(S): at 14:46

## 2017-02-08 RX ADMIN — Medication 100 MILLIGRAM(S): at 17:18

## 2017-02-08 RX ADMIN — Medication 1 TABLET(S): at 17:19

## 2017-02-08 NOTE — DIETITIAN INITIAL EVALUATION ADULT. - NUTRITION INTERVENTION
Medical Food Supplements/Meals and Snack/Nutrition Education/Vitamin/Nutrition - Related Medication Management

## 2017-02-08 NOTE — DIETITIAN INITIAL EVALUATION ADULT. - OTHER INFO
Pt awake/alert at time of visit; daughter at bedside. Dx colitis/gastroenteritis. On IVF. Tolerating low fiber, low na diet well w/ fair appetite/po intake at present. No report N/V. Last BM 2/6 (diarrhea w/ blood noted). No further BM since. Hx contsipation. Takes miralax, prune juice pta. Follows regular diet pta. Drinks ensure daily plus activia yogurt daily for optimal bowel regularity. Wt stable.

## 2017-02-08 NOTE — DIETITIAN INITIAL EVALUATION ADULT. - NS AS NUTRI INTERV MEDICAL AND FOOD SUPPLEMENTS
Recommend ensure enlive 8oz po daily (vanilla) to help meet estimated kcal/pro needs./Commercial beverage

## 2017-02-08 NOTE — DIETITIAN INITIAL EVALUATION ADULT. - NS AS NUTRI INTERV ED CONTENT
Written and verbal instructions provided to pt/pts daughter regarding low fiber nutrition therapy. Good understanding of material discussed. RDs name/phone number left with patient if questions/concerns arise.

## 2017-02-09 LAB
-  AMIKACIN: SIGNIFICANT CHANGE UP
-  AMPICILLIN/SULBACTAM: SIGNIFICANT CHANGE UP
-  AMPICILLIN: SIGNIFICANT CHANGE UP
-  AZTREONAM: SIGNIFICANT CHANGE UP
-  CEFAZOLIN: SIGNIFICANT CHANGE UP
-  CEFEPIME: SIGNIFICANT CHANGE UP
-  CEFOXITIN: SIGNIFICANT CHANGE UP
-  CEFTAZIDIME: SIGNIFICANT CHANGE UP
-  CEFTRIAXONE: SIGNIFICANT CHANGE UP
-  CIPROFLOXACIN: SIGNIFICANT CHANGE UP
-  ERTAPENEM: SIGNIFICANT CHANGE UP
-  GENTAMICIN: SIGNIFICANT CHANGE UP
-  IMIPENEM: SIGNIFICANT CHANGE UP
-  LEVOFLOXACIN: SIGNIFICANT CHANGE UP
-  MEROPENEM: SIGNIFICANT CHANGE UP
-  NITROFURANTOIN: SIGNIFICANT CHANGE UP
-  PIPERACILLIN/TAZOBACTAM: SIGNIFICANT CHANGE UP
-  TOBRAMYCIN: SIGNIFICANT CHANGE UP
-  TRIMETHOPRIM/SULFAMETHOXAZOLE: SIGNIFICANT CHANGE UP
ALBUMIN SERPL ELPH-MCNC: 2.4 G/DL — LOW (ref 3.3–5)
ALP SERPL-CCNC: 71 U/L — SIGNIFICANT CHANGE UP (ref 40–120)
ALT FLD-CCNC: 14 U/L — SIGNIFICANT CHANGE UP (ref 12–78)
ANION GAP SERPL CALC-SCNC: 10 MMOL/L — SIGNIFICANT CHANGE UP (ref 5–17)
AST SERPL-CCNC: 33 U/L — SIGNIFICANT CHANGE UP (ref 15–37)
BASOPHILS # BLD AUTO: 0.1 K/UL — SIGNIFICANT CHANGE UP (ref 0–0.2)
BASOPHILS NFR BLD AUTO: 0.5 % — SIGNIFICANT CHANGE UP (ref 0–2)
BILIRUB SERPL-MCNC: 0.5 MG/DL — SIGNIFICANT CHANGE UP (ref 0.2–1.2)
BUN SERPL-MCNC: 19 MG/DL — SIGNIFICANT CHANGE UP (ref 7–23)
CALCIUM SERPL-MCNC: 8.8 MG/DL — SIGNIFICANT CHANGE UP (ref 8.5–10.1)
CHLORIDE SERPL-SCNC: 110 MMOL/L — HIGH (ref 96–108)
CK MB BLD-MCNC: 2.9 % — SIGNIFICANT CHANGE UP (ref 0–3.5)
CK MB BLD-MCNC: 3.3 % — SIGNIFICANT CHANGE UP (ref 0–3.5)
CK MB CFR SERPL CALC: 6 NG/ML — HIGH (ref 0–3.6)
CK MB CFR SERPL CALC: 6.9 NG/ML — HIGH (ref 0–3.6)
CK SERPL-CCNC: 182 U/L — SIGNIFICANT CHANGE UP (ref 26–192)
CK SERPL-CCNC: 239 U/L — HIGH (ref 26–192)
CO2 SERPL-SCNC: 22 MMOL/L — SIGNIFICANT CHANGE UP (ref 22–31)
CREAT SERPL-MCNC: 0.7 MG/DL — SIGNIFICANT CHANGE UP (ref 0.5–1.3)
CULTURE RESULTS: SIGNIFICANT CHANGE UP
EOSINOPHIL # BLD AUTO: 0.3 K/UL — SIGNIFICANT CHANGE UP (ref 0–0.5)
EOSINOPHIL NFR BLD AUTO: 1.8 % — SIGNIFICANT CHANGE UP (ref 0–6)
GLUCOSE SERPL-MCNC: 120 MG/DL — HIGH (ref 70–99)
HCT VFR BLD CALC: 34.1 % — LOW (ref 34.5–45)
HCT VFR BLD CALC: 35.1 % — SIGNIFICANT CHANGE UP (ref 34.5–45)
HGB BLD-MCNC: 11 G/DL — LOW (ref 11.5–15.5)
HGB BLD-MCNC: 11 G/DL — LOW (ref 11.5–15.5)
LACTATE SERPL-SCNC: 1.3 MMOL/L — SIGNIFICANT CHANGE UP (ref 0.7–2)
LYMPHOCYTES # BLD AUTO: 1.9 K/UL — SIGNIFICANT CHANGE UP (ref 1–3.3)
LYMPHOCYTES # BLD AUTO: 11.9 % — LOW (ref 13–44)
MAGNESIUM SERPL-MCNC: 2 MG/DL — SIGNIFICANT CHANGE UP (ref 1.8–2.4)
MCHC RBC-ENTMCNC: 31.5 PG — SIGNIFICANT CHANGE UP (ref 27–34)
MCHC RBC-ENTMCNC: 32.4 GM/DL — SIGNIFICANT CHANGE UP (ref 32–36)
MCV RBC AUTO: 97.3 FL — SIGNIFICANT CHANGE UP (ref 80–100)
METHOD TYPE: SIGNIFICANT CHANGE UP
MONOCYTES # BLD AUTO: 1.1 K/UL — HIGH (ref 0–0.9)
MONOCYTES NFR BLD AUTO: 6.8 % — SIGNIFICANT CHANGE UP (ref 1–9)
NEUTROPHILS # BLD AUTO: 12.8 K/UL — HIGH (ref 1.8–7.4)
NEUTROPHILS NFR BLD AUTO: 79 % — HIGH (ref 43–77)
ORGANISM # SPEC MICROSCOPIC CNT: SIGNIFICANT CHANGE UP
ORGANISM # SPEC MICROSCOPIC CNT: SIGNIFICANT CHANGE UP
PLATELET # BLD AUTO: 198 K/UL — SIGNIFICANT CHANGE UP (ref 150–400)
POTASSIUM SERPL-MCNC: 4.1 MMOL/L — SIGNIFICANT CHANGE UP (ref 3.5–5.3)
POTASSIUM SERPL-SCNC: 4.1 MMOL/L — SIGNIFICANT CHANGE UP (ref 3.5–5.3)
PROT SERPL-MCNC: 6.4 G/DL — SIGNIFICANT CHANGE UP (ref 6–8.3)
RBC # BLD: 3.5 M/UL — LOW (ref 3.8–5.2)
RBC # FLD: 13.2 % — SIGNIFICANT CHANGE UP (ref 10.3–14.5)
SODIUM SERPL-SCNC: 142 MMOL/L — SIGNIFICANT CHANGE UP (ref 135–145)
SPECIMEN SOURCE: SIGNIFICANT CHANGE UP
T3 SERPL-MCNC: 57 NG/DL — LOW (ref 80–200)
T4 AB SER-ACNC: 5.4 UG/DL — SIGNIFICANT CHANGE UP (ref 4.6–12)
TROPONIN I SERPL-MCNC: 1.46 NG/ML — HIGH (ref 0.01–0.04)
TROPONIN I SERPL-MCNC: 1.62 NG/ML — HIGH (ref 0.01–0.04)
WBC # BLD: 16.2 K/UL — HIGH (ref 3.8–10.5)
WBC # FLD AUTO: 16.2 K/UL — HIGH (ref 3.8–10.5)

## 2017-02-09 PROCEDURE — 93010 ELECTROCARDIOGRAM REPORT: CPT

## 2017-02-09 PROCEDURE — 99233 SBSQ HOSP IP/OBS HIGH 50: CPT | Mod: GC

## 2017-02-09 PROCEDURE — 99223 1ST HOSP IP/OBS HIGH 75: CPT

## 2017-02-09 RX ORDER — HEPARIN SODIUM 5000 [USP'U]/ML
5000 INJECTION INTRAVENOUS; SUBCUTANEOUS EVERY 12 HOURS
Qty: 0 | Refills: 0 | Status: DISCONTINUED | OUTPATIENT
Start: 2017-02-09 | End: 2017-02-13

## 2017-02-09 RX ORDER — NICOTINE POLACRILEX 2 MG
1 GUM BUCCAL DAILY
Qty: 0 | Refills: 0 | Status: DISCONTINUED | OUTPATIENT
Start: 2017-02-09 | End: 2017-02-17

## 2017-02-09 RX ORDER — METOPROLOL TARTRATE 50 MG
25 TABLET ORAL EVERY 8 HOURS
Qty: 0 | Refills: 0 | Status: DISCONTINUED | OUTPATIENT
Start: 2017-02-09 | End: 2017-02-13

## 2017-02-09 RX ORDER — LANOLIN ALCOHOL/MO/W.PET/CERES
1 CREAM (GRAM) TOPICAL
Qty: 0 | Refills: 0 | COMMUNITY

## 2017-02-09 RX ORDER — POTASSIUM PHOSPHATE, MONOBASIC POTASSIUM PHOSPHATE, DIBASIC 236; 224 MG/ML; MG/ML
15 INJECTION, SOLUTION INTRAVENOUS ONCE
Qty: 0 | Refills: 0 | Status: COMPLETED | OUTPATIENT
Start: 2017-02-09 | End: 2017-02-09

## 2017-02-09 RX ADMIN — Medication 100 MILLIGRAM(S): at 08:26

## 2017-02-09 RX ADMIN — Medication 1 TABLET(S): at 08:26

## 2017-02-09 RX ADMIN — Medication 5 MILLIGRAM(S): at 17:11

## 2017-02-09 RX ADMIN — Medication 1 PATCH: at 11:06

## 2017-02-09 RX ADMIN — Medication 1 TABLET(S): at 17:11

## 2017-02-09 RX ADMIN — Medication 25 MILLIGRAM(S): at 14:48

## 2017-02-09 RX ADMIN — Medication 20 MILLIGRAM(S): at 11:06

## 2017-02-09 RX ADMIN — HEPARIN SODIUM 5000 UNIT(S): 5000 INJECTION INTRAVENOUS; SUBCUTANEOUS at 23:48

## 2017-02-09 RX ADMIN — Medication 200 MILLIGRAM(S): at 05:14

## 2017-02-09 RX ADMIN — Medication 5 MILLIGRAM(S): at 05:14

## 2017-02-09 RX ADMIN — Medication 200 MILLIGRAM(S): at 18:32

## 2017-02-09 RX ADMIN — POTASSIUM PHOSPHATE, MONOBASIC POTASSIUM PHOSPHATE, DIBASIC 63.75 MILLIMOLE(S): 236; 224 INJECTION, SOLUTION INTRAVENOUS at 21:41

## 2017-02-09 RX ADMIN — Medication 25 MILLIGRAM(S): at 21:06

## 2017-02-09 RX ADMIN — Medication 1 TABLET(S): at 13:10

## 2017-02-09 RX ADMIN — PANTOPRAZOLE SODIUM 40 MILLIGRAM(S): 20 TABLET, DELAYED RELEASE ORAL at 17:11

## 2017-02-09 RX ADMIN — PANTOPRAZOLE SODIUM 40 MILLIGRAM(S): 20 TABLET, DELAYED RELEASE ORAL at 05:15

## 2017-02-09 RX ADMIN — Medication 100 MILLIGRAM(S): at 01:39

## 2017-02-09 RX ADMIN — Medication 100 MILLIGRAM(S): at 17:30

## 2017-02-09 RX ADMIN — Medication 1 PATCH: at 11:05

## 2017-02-09 NOTE — GOALS OF CARE CONVERSATION - PERSONAL ADVANCE DIRECTIVE - CONVERSATION DETAILS
contacted pt son, Librado, on phone, regarding AD, pt has living will, w dnr, son to contact his sister regarding putting a dnr in place. son to locate hcp in safe deposit box. pt remains full code at present. will discuss further after son talks to his sister. will follow

## 2017-02-10 LAB
ANION GAP SERPL CALC-SCNC: 10 MMOL/L — SIGNIFICANT CHANGE UP (ref 5–17)
BUN SERPL-MCNC: 24 MG/DL — HIGH (ref 7–23)
CALCIUM SERPL-MCNC: 8.6 MG/DL — SIGNIFICANT CHANGE UP (ref 8.5–10.1)
CHLORIDE SERPL-SCNC: 113 MMOL/L — HIGH (ref 96–108)
CO2 SERPL-SCNC: 21 MMOL/L — LOW (ref 22–31)
CREAT SERPL-MCNC: 0.73 MG/DL — SIGNIFICANT CHANGE UP (ref 0.5–1.3)
FERRITIN SERPL-MCNC: 218 NG/ML — HIGH (ref 15–150)
FOLATE SERPL-MCNC: 19.3 NG/ML — SIGNIFICANT CHANGE UP (ref 4.8–24.2)
GLUCOSE SERPL-MCNC: 144 MG/DL — HIGH (ref 70–99)
HCT VFR BLD CALC: 35 % — SIGNIFICANT CHANGE UP (ref 34.5–45)
HGB BLD-MCNC: 11 G/DL — LOW (ref 11.5–15.5)
MAGNESIUM SERPL-MCNC: 2 MG/DL — SIGNIFICANT CHANGE UP (ref 1.8–2.4)
MCHC RBC-ENTMCNC: 30.7 PG — SIGNIFICANT CHANGE UP (ref 27–34)
MCHC RBC-ENTMCNC: 31.4 GM/DL — LOW (ref 32–36)
MCV RBC AUTO: 98 FL — SIGNIFICANT CHANGE UP (ref 80–100)
PLATELET # BLD AUTO: 211 K/UL — SIGNIFICANT CHANGE UP (ref 150–400)
POTASSIUM SERPL-MCNC: 4.8 MMOL/L — SIGNIFICANT CHANGE UP (ref 3.5–5.3)
POTASSIUM SERPL-SCNC: 4.8 MMOL/L — SIGNIFICANT CHANGE UP (ref 3.5–5.3)
RBC # BLD: 3.57 M/UL — LOW (ref 3.8–5.2)
RBC # FLD: 13.6 % — SIGNIFICANT CHANGE UP (ref 10.3–14.5)
SODIUM SERPL-SCNC: 144 MMOL/L — SIGNIFICANT CHANGE UP (ref 135–145)
VIT B12 SERPL-MCNC: >2000 PG/ML — HIGH (ref 243–894)
WBC # BLD: 13.6 K/UL — HIGH (ref 3.8–10.5)
WBC # FLD AUTO: 13.6 K/UL — HIGH (ref 3.8–10.5)

## 2017-02-10 PROCEDURE — 93306 TTE W/DOPPLER COMPLETE: CPT | Mod: 26

## 2017-02-10 PROCEDURE — 99233 SBSQ HOSP IP/OBS HIGH 50: CPT

## 2017-02-10 PROCEDURE — 71010: CPT | Mod: 26

## 2017-02-10 PROCEDURE — 99232 SBSQ HOSP IP/OBS MODERATE 35: CPT

## 2017-02-10 RX ORDER — DIPHENHYDRAMINE HCL 50 MG
25 CAPSULE ORAL ONCE
Qty: 0 | Refills: 0 | Status: DISCONTINUED | OUTPATIENT
Start: 2017-02-10 | End: 2017-02-17

## 2017-02-10 RX ADMIN — Medication 5 MILLIGRAM(S): at 18:06

## 2017-02-10 RX ADMIN — Medication 25 MILLIGRAM(S): at 23:22

## 2017-02-10 RX ADMIN — PANTOPRAZOLE SODIUM 40 MILLIGRAM(S): 20 TABLET, DELAYED RELEASE ORAL at 18:06

## 2017-02-10 RX ADMIN — Medication 100 MILLIGRAM(S): at 01:48

## 2017-02-10 RX ADMIN — Medication 1 PATCH: at 12:29

## 2017-02-10 RX ADMIN — Medication 200 MILLIGRAM(S): at 18:06

## 2017-02-10 RX ADMIN — Medication 5 MILLIGRAM(S): at 05:49

## 2017-02-10 RX ADMIN — Medication 1 TABLET(S): at 18:06

## 2017-02-10 RX ADMIN — PANTOPRAZOLE SODIUM 40 MILLIGRAM(S): 20 TABLET, DELAYED RELEASE ORAL at 05:49

## 2017-02-10 RX ADMIN — Medication 200 MILLIGRAM(S): at 05:49

## 2017-02-10 RX ADMIN — Medication 100 MILLIGRAM(S): at 16:53

## 2017-02-10 RX ADMIN — Medication 25 MILLIGRAM(S): at 13:41

## 2017-02-10 RX ADMIN — Medication 100 MILLIGRAM(S): at 09:21

## 2017-02-10 RX ADMIN — Medication 25 MILLIGRAM(S): at 05:49

## 2017-02-10 RX ADMIN — Medication 1 TABLET(S): at 12:29

## 2017-02-10 RX ADMIN — Medication 20 MILLIGRAM(S): at 12:29

## 2017-02-10 RX ADMIN — Medication 1 TABLET(S): at 08:18

## 2017-02-10 RX ADMIN — Medication 1 PATCH: at 12:28

## 2017-02-10 RX ADMIN — HEPARIN SODIUM 5000 UNIT(S): 5000 INJECTION INTRAVENOUS; SUBCUTANEOUS at 11:15

## 2017-02-10 RX ADMIN — HEPARIN SODIUM 5000 UNIT(S): 5000 INJECTION INTRAVENOUS; SUBCUTANEOUS at 23:22

## 2017-02-10 NOTE — PROVIDER CONTACT NOTE (CRITICAL VALUE NOTIFICATION) - SITUATION
Blood culture collected on 2/09/17, preliminary report on 2/10/17, growth in aerobic bottle is positive for gram positive cocci in chains

## 2017-02-11 LAB
ANION GAP SERPL CALC-SCNC: 9 MMOL/L — SIGNIFICANT CHANGE UP (ref 5–17)
BUN SERPL-MCNC: 25 MG/DL — HIGH (ref 7–23)
CALCIUM SERPL-MCNC: 9 MG/DL — SIGNIFICANT CHANGE UP (ref 8.5–10.1)
CHLORIDE SERPL-SCNC: 111 MMOL/L — HIGH (ref 96–108)
CO2 SERPL-SCNC: 23 MMOL/L — SIGNIFICANT CHANGE UP (ref 22–31)
CREAT SERPL-MCNC: 0.61 MG/DL — SIGNIFICANT CHANGE UP (ref 0.5–1.3)
CULTURE RESULTS: SIGNIFICANT CHANGE UP
GLUCOSE SERPL-MCNC: 119 MG/DL — HIGH (ref 70–99)
HCT VFR BLD CALC: 33.5 % — LOW (ref 34.5–45)
HGB BLD-MCNC: 10.8 G/DL — LOW (ref 11.5–15.5)
MAGNESIUM SERPL-MCNC: 2.2 MG/DL — SIGNIFICANT CHANGE UP (ref 1.8–2.4)
MCHC RBC-ENTMCNC: 31.2 PG — SIGNIFICANT CHANGE UP (ref 27–34)
MCHC RBC-ENTMCNC: 32.4 GM/DL — SIGNIFICANT CHANGE UP (ref 32–36)
MCV RBC AUTO: 96.1 FL — SIGNIFICANT CHANGE UP (ref 80–100)
PHOSPHATE SERPL-MCNC: 1.7 MG/DL — LOW (ref 2.5–4.5)
PLATELET # BLD AUTO: 212 K/UL — SIGNIFICANT CHANGE UP (ref 150–400)
POTASSIUM SERPL-MCNC: 4.3 MMOL/L — SIGNIFICANT CHANGE UP (ref 3.5–5.3)
POTASSIUM SERPL-SCNC: 4.3 MMOL/L — SIGNIFICANT CHANGE UP (ref 3.5–5.3)
RBC # BLD: 3.48 M/UL — LOW (ref 3.8–5.2)
RBC # FLD: 13.2 % — SIGNIFICANT CHANGE UP (ref 10.3–14.5)
SODIUM SERPL-SCNC: 143 MMOL/L — SIGNIFICANT CHANGE UP (ref 135–145)
SPECIMEN SOURCE: SIGNIFICANT CHANGE UP
WBC # BLD: 14 K/UL — HIGH (ref 3.8–10.5)
WBC # FLD AUTO: 14 K/UL — HIGH (ref 3.8–10.5)

## 2017-02-11 PROCEDURE — 99232 SBSQ HOSP IP/OBS MODERATE 35: CPT

## 2017-02-11 PROCEDURE — 99233 SBSQ HOSP IP/OBS HIGH 50: CPT

## 2017-02-11 RX ORDER — ALPRAZOLAM 0.25 MG
0.25 TABLET ORAL ONCE
Qty: 0 | Refills: 0 | Status: DISCONTINUED | OUTPATIENT
Start: 2017-02-11 | End: 2017-02-11

## 2017-02-11 RX ADMIN — Medication 1 TABLET(S): at 12:11

## 2017-02-11 RX ADMIN — HEPARIN SODIUM 5000 UNIT(S): 5000 INJECTION INTRAVENOUS; SUBCUTANEOUS at 11:13

## 2017-02-11 RX ADMIN — Medication 5 MILLIGRAM(S): at 17:02

## 2017-02-11 RX ADMIN — Medication 1 TABLET(S): at 07:54

## 2017-02-11 RX ADMIN — Medication 1 PATCH: at 11:13

## 2017-02-11 RX ADMIN — Medication 25 MILLIGRAM(S): at 13:27

## 2017-02-11 RX ADMIN — Medication 100 MILLIGRAM(S): at 00:16

## 2017-02-11 RX ADMIN — Medication 1 PATCH: at 12:13

## 2017-02-11 RX ADMIN — Medication 25 MILLIGRAM(S): at 22:46

## 2017-02-11 RX ADMIN — Medication 5 MILLIGRAM(S): at 05:32

## 2017-02-11 RX ADMIN — Medication 200 MILLIGRAM(S): at 05:32

## 2017-02-11 RX ADMIN — Medication 100 MILLIGRAM(S): at 17:03

## 2017-02-11 RX ADMIN — Medication 0.25 MILLIGRAM(S): at 22:57

## 2017-02-11 RX ADMIN — PANTOPRAZOLE SODIUM 40 MILLIGRAM(S): 20 TABLET, DELAYED RELEASE ORAL at 17:03

## 2017-02-11 RX ADMIN — Medication 20 MILLIGRAM(S): at 11:13

## 2017-02-11 RX ADMIN — Medication 1 TABLET(S): at 17:02

## 2017-02-11 RX ADMIN — Medication 200 MILLIGRAM(S): at 18:21

## 2017-02-11 RX ADMIN — HEPARIN SODIUM 5000 UNIT(S): 5000 INJECTION INTRAVENOUS; SUBCUTANEOUS at 22:49

## 2017-02-11 RX ADMIN — Medication 25 MILLIGRAM(S): at 05:32

## 2017-02-11 RX ADMIN — Medication 100 MILLIGRAM(S): at 08:33

## 2017-02-11 RX ADMIN — PANTOPRAZOLE SODIUM 40 MILLIGRAM(S): 20 TABLET, DELAYED RELEASE ORAL at 05:32

## 2017-02-11 NOTE — SWALLOW BEDSIDE ASSESSMENT ADULT - ASR SWALLOW ASPIRATION MONITOR
pneumonia/throat clearing/change of breathing pattern/oral hygiene/fever/upper respiratory infection/position upright (90Y)/cough/gurgly voice

## 2017-02-11 NOTE — SWALLOW BEDSIDE ASSESSMENT ADULT - SWALLOW EVAL: RECOMMENDED FEEDING/EATING TECHNIQUES
oral hygiene/maintain upright posture during/after eating for 30 mins/position upright (90 degrees)/crush medication (when feasible)/allow for swallow between intakes

## 2017-02-11 NOTE — SWALLOW BEDSIDE ASSESSMENT ADULT - ORAL PHASE
Delayed oral transit time/Decreased anterior-posterior movement of the bolus Lingual stasis/Stasis in lateral sulci/Delayed oral transit time/Decreased anterior-posterior movement of the bolus

## 2017-02-11 NOTE — SWALLOW BEDSIDE ASSESSMENT ADULT - COMMENTS
Pt A+Ox2, cooperative.  Pt with COPD, baseline cough.  Pt presents with oropharyngeal dysphagia characterized by reduced oral grading, prolonged mastication, latent AP transport, swallow delay.  Overt s/s of aspiration for thin liquids.

## 2017-02-11 NOTE — SWALLOW BEDSIDE ASSESSMENT ADULT - NS SPL SWALLOW CLINIC TRIAL FT
Overt s/s of aspiration noted for thin liquids.  No overt s/s of aspiration noted for nectar thickened liquids

## 2017-02-11 NOTE — SWALLOW BEDSIDE ASSESSMENT ADULT - PHARYNGEAL PHASE
Delayed pharyngeal swallow/Cough post oral intake Decreased laryngeal elevation/Delayed pharyngeal swallow

## 2017-02-12 ENCOUNTER — RESULT REVIEW (OUTPATIENT)
Age: 82
End: 2017-02-12

## 2017-02-12 LAB
ANION GAP SERPL CALC-SCNC: 8 MMOL/L — SIGNIFICANT CHANGE UP (ref 5–17)
BUN SERPL-MCNC: 25 MG/DL — HIGH (ref 7–23)
CALCIUM SERPL-MCNC: 8.6 MG/DL — SIGNIFICANT CHANGE UP (ref 8.5–10.1)
CHLORIDE SERPL-SCNC: 110 MMOL/L — HIGH (ref 96–108)
CO2 SERPL-SCNC: 24 MMOL/L — SIGNIFICANT CHANGE UP (ref 22–31)
CREAT SERPL-MCNC: 0.64 MG/DL — SIGNIFICANT CHANGE UP (ref 0.5–1.3)
CULTURE RESULTS: SIGNIFICANT CHANGE UP
CULTURE RESULTS: SIGNIFICANT CHANGE UP
GLUCOSE SERPL-MCNC: 142 MG/DL — HIGH (ref 70–99)
HCT VFR BLD CALC: 34.5 % — SIGNIFICANT CHANGE UP (ref 34.5–45)
HGB BLD-MCNC: 10.9 G/DL — LOW (ref 11.5–15.5)
LEGIONELLA AG UR QL: NEGATIVE — SIGNIFICANT CHANGE UP
MCHC RBC-ENTMCNC: 31.3 PG — SIGNIFICANT CHANGE UP (ref 27–34)
MCHC RBC-ENTMCNC: 31.7 GM/DL — LOW (ref 32–36)
MCV RBC AUTO: 98.8 FL — SIGNIFICANT CHANGE UP (ref 80–100)
PLATELET # BLD AUTO: 244 K/UL — SIGNIFICANT CHANGE UP (ref 150–400)
POTASSIUM SERPL-MCNC: 4 MMOL/L — SIGNIFICANT CHANGE UP (ref 3.5–5.3)
POTASSIUM SERPL-SCNC: 4 MMOL/L — SIGNIFICANT CHANGE UP (ref 3.5–5.3)
RBC # BLD: 3.49 M/UL — LOW (ref 3.8–5.2)
RBC # FLD: 13.2 % — SIGNIFICANT CHANGE UP (ref 10.3–14.5)
SODIUM SERPL-SCNC: 142 MMOL/L — SIGNIFICANT CHANGE UP (ref 135–145)
SPECIMEN SOURCE: SIGNIFICANT CHANGE UP
SPECIMEN SOURCE: SIGNIFICANT CHANGE UP
WBC # BLD: 13.6 K/UL — HIGH (ref 3.8–10.5)
WBC # FLD AUTO: 13.6 K/UL — HIGH (ref 3.8–10.5)

## 2017-02-12 PROCEDURE — 71250 CT THORAX DX C-: CPT | Mod: 26

## 2017-02-12 PROCEDURE — 99232 SBSQ HOSP IP/OBS MODERATE 35: CPT

## 2017-02-12 PROCEDURE — 99233 SBSQ HOSP IP/OBS HIGH 50: CPT

## 2017-02-12 PROCEDURE — 93010 ELECTROCARDIOGRAM REPORT: CPT

## 2017-02-12 RX ORDER — METRONIDAZOLE 500 MG
500 TABLET ORAL EVERY 8 HOURS
Qty: 0 | Refills: 0 | Status: DISCONTINUED | OUTPATIENT
Start: 2017-02-12 | End: 2017-02-17

## 2017-02-12 RX ADMIN — Medication 25 MILLIGRAM(S): at 06:00

## 2017-02-12 RX ADMIN — Medication 1 PATCH: at 11:22

## 2017-02-12 RX ADMIN — Medication 100 MILLIGRAM(S): at 08:24

## 2017-02-12 RX ADMIN — Medication 100 MILLIGRAM(S): at 00:48

## 2017-02-12 RX ADMIN — Medication 1 TABLET(S): at 12:16

## 2017-02-12 RX ADMIN — Medication 5 MILLIGRAM(S): at 06:00

## 2017-02-12 RX ADMIN — Medication 1 PATCH: at 11:30

## 2017-02-12 RX ADMIN — Medication 200 MILLIGRAM(S): at 06:00

## 2017-02-12 RX ADMIN — Medication 500 MILLIGRAM(S): at 14:00

## 2017-02-12 RX ADMIN — Medication 25 MILLIGRAM(S): at 22:46

## 2017-02-12 RX ADMIN — Medication 1 TABLET(S): at 17:21

## 2017-02-12 RX ADMIN — HEPARIN SODIUM 5000 UNIT(S): 5000 INJECTION INTRAVENOUS; SUBCUTANEOUS at 11:23

## 2017-02-12 RX ADMIN — Medication 500 MILLIGRAM(S): at 22:46

## 2017-02-12 RX ADMIN — PANTOPRAZOLE SODIUM 40 MILLIGRAM(S): 20 TABLET, DELAYED RELEASE ORAL at 17:21

## 2017-02-12 RX ADMIN — Medication 25 MILLIGRAM(S): at 14:00

## 2017-02-12 RX ADMIN — HEPARIN SODIUM 5000 UNIT(S): 5000 INJECTION INTRAVENOUS; SUBCUTANEOUS at 22:46

## 2017-02-12 RX ADMIN — Medication 1 TABLET(S): at 08:24

## 2017-02-12 RX ADMIN — Medication 20 MILLIGRAM(S): at 11:22

## 2017-02-12 RX ADMIN — PANTOPRAZOLE SODIUM 40 MILLIGRAM(S): 20 TABLET, DELAYED RELEASE ORAL at 06:00

## 2017-02-12 RX ADMIN — Medication 5 MILLIGRAM(S): at 17:21

## 2017-02-13 LAB
ALBUMIN FLD-MCNC: 0.9 G/DL — SIGNIFICANT CHANGE UP
ALBUMIN SERPL ELPH-MCNC: 2.3 G/DL — LOW (ref 3.3–5)
ALP SERPL-CCNC: 136 U/L — HIGH (ref 40–120)
ALT FLD-CCNC: 41 U/L — SIGNIFICANT CHANGE UP (ref 12–78)
ANION GAP SERPL CALC-SCNC: 8 MMOL/L — SIGNIFICANT CHANGE UP (ref 5–17)
AST SERPL-CCNC: 30 U/L — SIGNIFICANT CHANGE UP (ref 15–37)
B PERT IGG+IGM PNL SER: ABNORMAL
BILIRUB SERPL-MCNC: 0.3 MG/DL — SIGNIFICANT CHANGE UP (ref 0.2–1.2)
BUN SERPL-MCNC: 22 MG/DL — SIGNIFICANT CHANGE UP (ref 7–23)
CALCIUM SERPL-MCNC: 8.6 MG/DL — SIGNIFICANT CHANGE UP (ref 8.5–10.1)
CHLORIDE SERPL-SCNC: 109 MMOL/L — HIGH (ref 96–108)
CK SERPL-CCNC: 50 U/L — SIGNIFICANT CHANGE UP (ref 26–192)
CO2 SERPL-SCNC: 26 MMOL/L — SIGNIFICANT CHANGE UP (ref 22–31)
COLOR FLD: YELLOW — SIGNIFICANT CHANGE UP
CREAT SERPL-MCNC: 0.7 MG/DL — SIGNIFICANT CHANGE UP (ref 0.5–1.3)
CULTURE RESULTS: SIGNIFICANT CHANGE UP
FLUID INTAKE SUBSTANCE CLASS: SIGNIFICANT CHANGE UP
FLUID SEGMENTED GRANULOCYTES: 43 % — SIGNIFICANT CHANGE UP
GLUCOSE FLD-MCNC: 105 — SIGNIFICANT CHANGE UP
GLUCOSE SERPL-MCNC: 101 MG/DL — HIGH (ref 70–99)
GRAM STN FLD: SIGNIFICANT CHANGE UP
HCT VFR BLD CALC: 36.2 % — SIGNIFICANT CHANGE UP (ref 34.5–45)
HGB BLD-MCNC: 11.7 G/DL — SIGNIFICANT CHANGE UP (ref 11.5–15.5)
INR BLD: 1.76 RATIO — HIGH (ref 0.88–1.16)
LDH SERPL L TO P-CCNC: 116 U/L — SIGNIFICANT CHANGE UP
LDH SERPL L TO P-CCNC: 313 U/L — HIGH (ref 50–242)
LYMPHOCYTES # FLD: 11 % — SIGNIFICANT CHANGE UP
MAGNESIUM SERPL-MCNC: 2.1 MG/DL — SIGNIFICANT CHANGE UP (ref 1.8–2.4)
MCHC RBC-ENTMCNC: 31.8 PG — SIGNIFICANT CHANGE UP (ref 27–34)
MCHC RBC-ENTMCNC: 32.4 GM/DL — SIGNIFICANT CHANGE UP (ref 32–36)
MCV RBC AUTO: 98.2 FL — SIGNIFICANT CHANGE UP (ref 80–100)
MESOTHL CELL # FLD: 5 % — SIGNIFICANT CHANGE UP
MONOS+MACROS # FLD: 37 % — SIGNIFICANT CHANGE UP
OTHER CELLS FLD MANUAL: 4 % — SIGNIFICANT CHANGE UP
PH FLD: 7.42 — SIGNIFICANT CHANGE UP
PHOSPHATE SERPL-MCNC: 2.1 MG/DL — LOW (ref 2.5–4.5)
PLATELET # BLD AUTO: 296 K/UL — SIGNIFICANT CHANGE UP (ref 150–400)
POTASSIUM SERPL-MCNC: 4 MMOL/L — SIGNIFICANT CHANGE UP (ref 3.5–5.3)
POTASSIUM SERPL-SCNC: 4 MMOL/L — SIGNIFICANT CHANGE UP (ref 3.5–5.3)
PROT FLD-MCNC: 1.6 G/DL — SIGNIFICANT CHANGE UP
PROT SERPL-MCNC: 6.2 G/DL — SIGNIFICANT CHANGE UP (ref 6–8.3)
PROTHROM AB SERPL-ACNC: 19.6 SEC — HIGH (ref 10–13.1)
RBC # BLD: 3.68 M/UL — LOW (ref 3.8–5.2)
RBC # FLD: 13.5 % — SIGNIFICANT CHANGE UP (ref 10.3–14.5)
RCV VOL RI: 1000 /UL — HIGH (ref 0–5)
S PNEUM AG UR QL: NEGATIVE — SIGNIFICANT CHANGE UP
SODIUM SERPL-SCNC: 143 MMOL/L — SIGNIFICANT CHANGE UP (ref 135–145)
SPECIMEN SOURCE: SIGNIFICANT CHANGE UP
SPECIMEN SOURCE: SIGNIFICANT CHANGE UP
TOTAL NUCLEATED CELL COUNT, BODY FLUID: 1857 /UL — HIGH (ref 0–5)
TUBE TYPE: SIGNIFICANT CHANGE UP
WBC # BLD: 13.8 K/UL — HIGH (ref 3.8–10.5)
WBC # FLD AUTO: 13.8 K/UL — HIGH (ref 3.8–10.5)

## 2017-02-13 PROCEDURE — 88305 TISSUE EXAM BY PATHOLOGIST: CPT | Mod: 26

## 2017-02-13 PROCEDURE — 99233 SBSQ HOSP IP/OBS HIGH 50: CPT

## 2017-02-13 PROCEDURE — 32555 ASPIRATE PLEURA W/ IMAGING: CPT | Mod: LT

## 2017-02-13 PROCEDURE — 93970 EXTREMITY STUDY: CPT | Mod: 26

## 2017-02-13 PROCEDURE — 88108 CYTOPATH CONCENTRATE TECH: CPT | Mod: 26

## 2017-02-13 PROCEDURE — 71275 CT ANGIOGRAPHY CHEST: CPT | Mod: 26

## 2017-02-13 PROCEDURE — 71010: CPT | Mod: 26

## 2017-02-13 RX ORDER — METOPROLOL TARTRATE 50 MG
50 TABLET ORAL ONCE
Qty: 0 | Refills: 0 | Status: COMPLETED | OUTPATIENT
Start: 2017-02-13 | End: 2017-02-13

## 2017-02-13 RX ORDER — METOPROLOL TARTRATE 50 MG
50 TABLET ORAL THREE TIMES A DAY
Qty: 0 | Refills: 0 | Status: DISCONTINUED | OUTPATIENT
Start: 2017-02-13 | End: 2017-02-17

## 2017-02-13 RX ORDER — ACETAMINOPHEN 500 MG
650 TABLET ORAL EVERY 6 HOURS
Qty: 0 | Refills: 0 | Status: DISCONTINUED | OUTPATIENT
Start: 2017-02-13 | End: 2017-02-17

## 2017-02-13 RX ORDER — APIXABAN 2.5 MG/1
2.5 TABLET, FILM COATED ORAL
Qty: 0 | Refills: 0 | Status: DISCONTINUED | OUTPATIENT
Start: 2017-02-13 | End: 2017-02-17

## 2017-02-13 RX ORDER — POTASSIUM PHOSPHATE, MONOBASIC POTASSIUM PHOSPHATE, DIBASIC 236; 224 MG/ML; MG/ML
15 INJECTION, SOLUTION INTRAVENOUS ONCE
Qty: 0 | Refills: 0 | Status: COMPLETED | OUTPATIENT
Start: 2017-02-13 | End: 2017-02-13

## 2017-02-13 RX ADMIN — Medication 50 MILLIGRAM(S): at 09:18

## 2017-02-13 RX ADMIN — Medication 1 PATCH: at 15:07

## 2017-02-13 RX ADMIN — POTASSIUM PHOSPHATE, MONOBASIC POTASSIUM PHOSPHATE, DIBASIC 63.75 MILLIMOLE(S): 236; 224 INJECTION, SOLUTION INTRAVENOUS at 08:48

## 2017-02-13 RX ADMIN — APIXABAN 2.5 MILLIGRAM(S): 2.5 TABLET, FILM COATED ORAL at 17:31

## 2017-02-13 RX ADMIN — Medication 1 TABLET(S): at 17:31

## 2017-02-13 RX ADMIN — PANTOPRAZOLE SODIUM 40 MILLIGRAM(S): 20 TABLET, DELAYED RELEASE ORAL at 17:32

## 2017-02-13 RX ADMIN — Medication 500 MILLIGRAM(S): at 22:29

## 2017-02-13 RX ADMIN — Medication 500 MILLIGRAM(S): at 05:50

## 2017-02-13 RX ADMIN — PANTOPRAZOLE SODIUM 40 MILLIGRAM(S): 20 TABLET, DELAYED RELEASE ORAL at 05:50

## 2017-02-13 RX ADMIN — Medication 50 MILLIGRAM(S): at 22:28

## 2017-02-13 RX ADMIN — Medication 650 MILLIGRAM(S): at 17:32

## 2017-02-13 RX ADMIN — Medication 25 MILLIGRAM(S): at 05:50

## 2017-02-13 RX ADMIN — Medication 20 MILLIGRAM(S): at 15:06

## 2017-02-13 RX ADMIN — Medication 50 MILLIGRAM(S): at 15:09

## 2017-02-13 RX ADMIN — Medication 5 MILLIGRAM(S): at 17:31

## 2017-02-13 RX ADMIN — Medication 500 MILLIGRAM(S): at 15:07

## 2017-02-13 RX ADMIN — Medication 5 MILLIGRAM(S): at 05:50

## 2017-02-13 RX ADMIN — Medication 1 PATCH: at 15:10

## 2017-02-13 RX ADMIN — Medication 1 TABLET(S): at 08:48

## 2017-02-14 LAB
ANION GAP SERPL CALC-SCNC: 8 MMOL/L — SIGNIFICANT CHANGE UP (ref 5–17)
BUN SERPL-MCNC: 22 MG/DL — SIGNIFICANT CHANGE UP (ref 7–23)
CALCIUM SERPL-MCNC: 8.5 MG/DL — SIGNIFICANT CHANGE UP (ref 8.5–10.1)
CHLORIDE SERPL-SCNC: 109 MMOL/L — HIGH (ref 96–108)
CO2 SERPL-SCNC: 26 MMOL/L — SIGNIFICANT CHANGE UP (ref 22–31)
CREAT SERPL-MCNC: 0.66 MG/DL — SIGNIFICANT CHANGE UP (ref 0.5–1.3)
GLUCOSE SERPL-MCNC: 102 MG/DL — HIGH (ref 70–99)
HCT VFR BLD CALC: 35 % — SIGNIFICANT CHANGE UP (ref 34.5–45)
HGB BLD-MCNC: 11.2 G/DL — LOW (ref 11.5–15.5)
MCHC RBC-ENTMCNC: 31.6 PG — SIGNIFICANT CHANGE UP (ref 27–34)
MCHC RBC-ENTMCNC: 32.1 GM/DL — SIGNIFICANT CHANGE UP (ref 32–36)
MCV RBC AUTO: 98.3 FL — SIGNIFICANT CHANGE UP (ref 80–100)
NIGHT BLUE STAIN TISS: SIGNIFICANT CHANGE UP
NON-GYN CYTOLOGY SPEC: SIGNIFICANT CHANGE UP
PHOSPHATE SERPL-MCNC: 2.5 MG/DL — SIGNIFICANT CHANGE UP (ref 2.5–4.5)
PLATELET # BLD AUTO: 314 K/UL — SIGNIFICANT CHANGE UP (ref 150–400)
POTASSIUM SERPL-MCNC: 4.5 MMOL/L — SIGNIFICANT CHANGE UP (ref 3.5–5.3)
POTASSIUM SERPL-SCNC: 4.5 MMOL/L — SIGNIFICANT CHANGE UP (ref 3.5–5.3)
RBC # BLD: 3.56 M/UL — LOW (ref 3.8–5.2)
RBC # FLD: 13.8 % — SIGNIFICANT CHANGE UP (ref 10.3–14.5)
SODIUM SERPL-SCNC: 143 MMOL/L — SIGNIFICANT CHANGE UP (ref 135–145)
SPECIMEN SOURCE: SIGNIFICANT CHANGE UP
WBC # BLD: 11.2 K/UL — HIGH (ref 3.8–10.5)
WBC # FLD AUTO: 11.2 K/UL — HIGH (ref 3.8–10.5)

## 2017-02-14 PROCEDURE — 99233 SBSQ HOSP IP/OBS HIGH 50: CPT

## 2017-02-14 PROCEDURE — 99232 SBSQ HOSP IP/OBS MODERATE 35: CPT

## 2017-02-14 RX ORDER — DIGOXIN 250 MCG
0.25 TABLET ORAL EVERY 6 HOURS
Qty: 0 | Refills: 0 | Status: COMPLETED | OUTPATIENT
Start: 2017-02-14 | End: 2017-02-15

## 2017-02-14 RX ORDER — DIGOXIN 250 MCG
0.12 TABLET ORAL DAILY
Qty: 0 | Refills: 0 | Status: DISCONTINUED | OUTPATIENT
Start: 2017-02-15 | End: 2017-02-16

## 2017-02-14 RX ORDER — FUROSEMIDE 40 MG
20 TABLET ORAL DAILY
Qty: 0 | Refills: 0 | Status: DISCONTINUED | OUTPATIENT
Start: 2017-02-14 | End: 2017-02-17

## 2017-02-14 RX ORDER — DIPHENHYDRAMINE HCL 50 MG
25 CAPSULE ORAL AT BEDTIME
Qty: 0 | Refills: 0 | Status: COMPLETED | OUTPATIENT
Start: 2017-02-14 | End: 2017-02-14

## 2017-02-14 RX ADMIN — APIXABAN 2.5 MILLIGRAM(S): 2.5 TABLET, FILM COATED ORAL at 18:27

## 2017-02-14 RX ADMIN — Medication 500 MILLIGRAM(S): at 21:13

## 2017-02-14 RX ADMIN — Medication 50 MILLIGRAM(S): at 21:13

## 2017-02-14 RX ADMIN — Medication 1 PATCH: at 12:35

## 2017-02-14 RX ADMIN — Medication 0.25 MILLIGRAM(S): at 12:35

## 2017-02-14 RX ADMIN — Medication 5 MILLIGRAM(S): at 18:27

## 2017-02-14 RX ADMIN — Medication 25 MILLIGRAM(S): at 21:13

## 2017-02-14 RX ADMIN — Medication 1 TABLET(S): at 08:41

## 2017-02-14 RX ADMIN — Medication 0.25 MILLIGRAM(S): at 18:27

## 2017-02-14 RX ADMIN — Medication 5 MILLIGRAM(S): at 05:26

## 2017-02-14 RX ADMIN — APIXABAN 2.5 MILLIGRAM(S): 2.5 TABLET, FILM COATED ORAL at 05:26

## 2017-02-14 RX ADMIN — Medication 50 MILLIGRAM(S): at 05:26

## 2017-02-14 RX ADMIN — Medication 500 MILLIGRAM(S): at 05:26

## 2017-02-14 RX ADMIN — PANTOPRAZOLE SODIUM 40 MILLIGRAM(S): 20 TABLET, DELAYED RELEASE ORAL at 05:29

## 2017-02-14 RX ADMIN — Medication 1 TABLET(S): at 18:27

## 2017-02-14 RX ADMIN — Medication 500 MILLIGRAM(S): at 14:57

## 2017-02-14 RX ADMIN — Medication 20 MILLIGRAM(S): at 12:35

## 2017-02-14 RX ADMIN — Medication 50 MILLIGRAM(S): at 14:57

## 2017-02-14 RX ADMIN — Medication 20 MILLIGRAM(S): at 14:57

## 2017-02-14 RX ADMIN — PANTOPRAZOLE SODIUM 40 MILLIGRAM(S): 20 TABLET, DELAYED RELEASE ORAL at 18:27

## 2017-02-14 RX ADMIN — Medication 1 TABLET(S): at 12:35

## 2017-02-15 ENCOUNTER — TRANSCRIPTION ENCOUNTER (OUTPATIENT)
Age: 82
End: 2017-02-15

## 2017-02-15 LAB
ANION GAP SERPL CALC-SCNC: 8 MMOL/L — SIGNIFICANT CHANGE UP (ref 5–17)
BUN SERPL-MCNC: 20 MG/DL — SIGNIFICANT CHANGE UP (ref 7–23)
CALCIUM SERPL-MCNC: 8.5 MG/DL — SIGNIFICANT CHANGE UP (ref 8.5–10.1)
CHLORIDE SERPL-SCNC: 107 MMOL/L — SIGNIFICANT CHANGE UP (ref 96–108)
CO2 SERPL-SCNC: 28 MMOL/L — SIGNIFICANT CHANGE UP (ref 22–31)
COMMENT - FLUIDS: SIGNIFICANT CHANGE UP
CREAT SERPL-MCNC: 0.75 MG/DL — SIGNIFICANT CHANGE UP (ref 0.5–1.3)
GLUCOSE SERPL-MCNC: 101 MG/DL — HIGH (ref 70–99)
POTASSIUM SERPL-MCNC: 3.9 MMOL/L — SIGNIFICANT CHANGE UP (ref 3.5–5.3)
POTASSIUM SERPL-SCNC: 3.9 MMOL/L — SIGNIFICANT CHANGE UP (ref 3.5–5.3)
SODIUM SERPL-SCNC: 143 MMOL/L — SIGNIFICANT CHANGE UP (ref 135–145)

## 2017-02-15 PROCEDURE — 99232 SBSQ HOSP IP/OBS MODERATE 35: CPT

## 2017-02-15 PROCEDURE — 99232 SBSQ HOSP IP/OBS MODERATE 35: CPT | Mod: GC

## 2017-02-15 PROCEDURE — 71010: CPT | Mod: 26

## 2017-02-15 RX ORDER — LACTOBACILLUS ACIDOPHILUS 100MM CELL
1 CAPSULE ORAL
Qty: 0 | Refills: 0 | COMMUNITY
Start: 2017-02-15 | End: 2017-02-28

## 2017-02-15 RX ORDER — FUROSEMIDE 40 MG
1 TABLET ORAL
Qty: 0 | Refills: 0 | COMMUNITY
Start: 2017-02-15

## 2017-02-15 RX ORDER — APIXABAN 2.5 MG/1
1 TABLET, FILM COATED ORAL
Qty: 0 | Refills: 0 | COMMUNITY
Start: 2017-02-15

## 2017-02-15 RX ORDER — METOPROLOL TARTRATE 50 MG
1 TABLET ORAL
Qty: 0 | Refills: 0 | COMMUNITY
Start: 2017-02-15

## 2017-02-15 RX ORDER — FUROSEMIDE 40 MG
2 TABLET ORAL
Qty: 0 | Refills: 0 | COMMUNITY
Start: 2017-02-15

## 2017-02-15 RX ORDER — FUROSEMIDE 40 MG
20 TABLET ORAL ONCE
Qty: 0 | Refills: 0 | Status: COMPLETED | OUTPATIENT
Start: 2017-02-15 | End: 2017-02-15

## 2017-02-15 RX ORDER — DIGOXIN 250 MCG
1 TABLET ORAL
Qty: 0 | Refills: 0 | COMMUNITY
Start: 2017-02-15

## 2017-02-15 RX ORDER — NICOTINE POLACRILEX 2 MG
1 GUM BUCCAL
Qty: 0 | Refills: 0 | COMMUNITY
Start: 2017-02-15 | End: 2017-02-28

## 2017-02-15 RX ORDER — ACETAMINOPHEN 500 MG
2 TABLET ORAL
Qty: 0 | Refills: 0 | COMMUNITY
Start: 2017-02-15

## 2017-02-15 RX ADMIN — APIXABAN 2.5 MILLIGRAM(S): 2.5 TABLET, FILM COATED ORAL at 06:12

## 2017-02-15 RX ADMIN — APIXABAN 2.5 MILLIGRAM(S): 2.5 TABLET, FILM COATED ORAL at 17:21

## 2017-02-15 RX ADMIN — Medication 500 MILLIGRAM(S): at 14:00

## 2017-02-15 RX ADMIN — Medication 20 MILLIGRAM(S): at 06:12

## 2017-02-15 RX ADMIN — Medication 1 PATCH: at 13:00

## 2017-02-15 RX ADMIN — PANTOPRAZOLE SODIUM 40 MILLIGRAM(S): 20 TABLET, DELAYED RELEASE ORAL at 06:12

## 2017-02-15 RX ADMIN — Medication 20 MILLIGRAM(S): at 13:01

## 2017-02-15 RX ADMIN — Medication 0.12 MILLIGRAM(S): at 06:12

## 2017-02-15 RX ADMIN — PANTOPRAZOLE SODIUM 40 MILLIGRAM(S): 20 TABLET, DELAYED RELEASE ORAL at 17:21

## 2017-02-15 RX ADMIN — Medication 500 MILLIGRAM(S): at 06:12

## 2017-02-15 RX ADMIN — Medication 0.25 MILLIGRAM(S): at 00:04

## 2017-02-15 RX ADMIN — Medication 500 MILLIGRAM(S): at 23:03

## 2017-02-15 RX ADMIN — Medication 5 MILLIGRAM(S): at 17:21

## 2017-02-15 RX ADMIN — Medication 50 MILLIGRAM(S): at 23:03

## 2017-02-15 RX ADMIN — Medication 1 PATCH: at 13:01

## 2017-02-15 RX ADMIN — Medication 50 MILLIGRAM(S): at 14:00

## 2017-02-15 RX ADMIN — Medication 1 TABLET(S): at 13:01

## 2017-02-15 RX ADMIN — Medication 50 MILLIGRAM(S): at 06:12

## 2017-02-15 RX ADMIN — Medication 1 TABLET(S): at 17:21

## 2017-02-15 RX ADMIN — Medication 5 MILLIGRAM(S): at 06:12

## 2017-02-15 RX ADMIN — Medication 1 TABLET(S): at 08:56

## 2017-02-15 RX ADMIN — Medication 20 MILLIGRAM(S): at 17:21

## 2017-02-15 RX ADMIN — Medication 0.5 MILLIGRAM(S): at 17:21

## 2017-02-15 NOTE — DISCHARGE NOTE ADULT - PLAN OF CARE
resolved, completed the course of abx, cipro and flagyl, f/u with Dr Chester as above left lobe, unknown etiology, complete the course of po Abx, levaquin and flagyl new onset and paroxisma, started on eliquis and digoxin, metoprolol. f/u with Dr lay, s/p tap, transudate, cont lasix f/u with Dr Arshad left lobe, unknown etiology, complete the course of po Abx, levaquine new onset and paroxismal, started on eliquis and digoxin, metoprolol. f/u with Dr lay, f/u with Dr Arshad, hold vesicar for now resolved, completed the course of abx, cipro and flagyl, f/u with Dr Chester.  Colonoscopy as outpatient. left lobe, unknown etiology, 2 more days of levaquin

## 2017-02-15 NOTE — DISCHARGE NOTE ADULT - PATIENT PORTAL LINK FT
“You can access the [employer] Patient Portal, offered by [facility], by registering with the following website: [email or web address]”

## 2017-02-15 NOTE — DISCHARGE NOTE ADULT - CARE PROVIDERS DIRECT ADDRESSES
,[email or web address]_Unknown,DirectAddress_Unknown ,[email or web address] of [email or web address]_Unknown,DirectAddress_Unknown,DirectAddress_Unknown ,[email or web address]_Unknown,DirectAddress_[email or web address] [email or web address]_[email or web address],DirectAddress_Unknown,DirectAddress_Unknown

## 2017-02-15 NOTE — DISCHARGE NOTE ADULT - ADDITIONAL INSTRUCTIONS
to rehab,   f/u with Dr Meng   f/u Dr Arshad   f/u Dr Chester  F/u with Dr Downs to rehab,   f/u with Dr Meng after being discharge from rehab.   f/u Dr Arshad in 1 week   f/u Dr Chester in 2 weeks   F/u with Dr Downs in 2 weeks  discharge on O2 3 lit continuously and Tejada cath.

## 2017-02-15 NOTE — DISCHARGE NOTE ADULT - PROVIDER TOKENS
TOKEN:'[id number]',TOKEN:'[id number]' TOKEN:'[id number]',TOKEN:'[id number]',TOKEN:'[id number]' TOKEN:'[id number]',TOKEN:'[id number]',TOKEN:'[id number]',TOKEN:'[id number]' TOKEN:'[id number]',TOKEN:'[id number]',TOKEN:'[id number]',TOKEN:'[id number]'

## 2017-02-15 NOTE — DISCHARGE NOTE ADULT - CARE PROVIDER_API CALL
Lew Downs (MD), Cardiovascular Disease; Internal Medicine  [address] [place] [address]  Phone: [phone]  Fax: [phone]    Keisha Garrett (), Emergency Medicine  [address] [place] [address]  Phone: [phone]  Fax: [phone] Lew Downs (MD), Cardiovascular Disease; Internal Medicine  [address] [place] [address]  Phone: [phone]  Fax: [phone]    Keisha Garrett (), Emergency Medicine  [address] [place] [address]  Phone: [phone]  Fax: [phone]    Allie Long), Neurology  [address] [address]  Phone: [phone]  Fax: [phone] Lew Downs (MD), Cardiovascular Disease; Internal Medicine  [address] [place] [address]  Phone: [phone]  Fax: [phone]    Keisha Garrett (), Emergency Medicine  [address] [place] [address]  Phone: [phone]  Fax: [phone]    Allie Long), Neurology  [address] [address]  Phone: [phone]  Fax: [phone]    Samia Meng), Internal Medicine  [address] [place] [address]  Phone: [phone]  Fax: [phone] Lew Downs), Cardiovascular Disease; Internal Medicine  [address] [place] [address]  Phone: [phone]  Fax: [phone]    Allie Long), Neurology  [address] [facility] [address]  [place] [address]  Phone: [phone]  Fax: [phone]    Samia Meng), Internal Medicine  [address] [place] [address]  Phone: [phone]  Fax: [phone]    Al Cruz (), Gastroenterology; Internal Medicine  [address] [place] [address]  Phone: [phone]  Fax: [phone]

## 2017-02-15 NOTE — DISCHARGE NOTE ADULT - REASON FOR ADMISSION
Patient came TO ER due to being unable to get OOB and having diarrhea since yesturday and pain in abdomen Patient came TO ER due to being unable to get OOB and having diarrhea since yesterday and pain in abdomen

## 2017-02-15 NOTE — DISCHARGE NOTE ADULT - CARE PLAN
Principal Discharge DX:	Enteritis  Goal:	resolved, completed the course of abx, cipro and flagyl, f/u with Dr Chester  Instructions for follow-up, activity and diet:	as above  Secondary Diagnosis:	Pneumonia  Goal:	left lobe, unknown etiology, complete the course of po Abx, levaquin and flagyl  Secondary Diagnosis:	Afib  Secondary Diagnosis:	Pleural effusion due to CHF (congestive heart failure)  Secondary Diagnosis:	Urinary retention Principal Discharge DX:	Enteritis  Goal:	resolved, completed the course of abx, cipro and flagyl, f/u with Dr Chester  Instructions for follow-up, activity and diet:	as above  Secondary Diagnosis:	Pneumonia  Goal:	left lobe, unknown etiology, complete the course of po Abx, levaquin and flagyl  Secondary Diagnosis:	Afib  Goal:	new onset and paroxisma, started on eliquis and digoxin, metoprolol. f/u with Dr lay,  Secondary Diagnosis:	Pleural effusion due to CHF (congestive heart failure)  Goal:	s/p tap, transudate, cont lasix  Secondary Diagnosis:	Urinary retention  Goal:	f/u with Dr Arshad Principal Discharge DX:	Enteritis  Goal:	resolved, completed the course of abx, cipro and flagyl, f/u with Dr Chester  Instructions for follow-up, activity and diet:	as above  Secondary Diagnosis:	Pneumonia  Goal:	left lobe, unknown etiology, complete the course of po Abx, levaquine  Secondary Diagnosis:	Afib  Goal:	new onset and paroxismal, started on eliquis and digoxin, metoprolol. f/u with Dr lay,  Instructions for follow-up, activity and diet:	as above  Secondary Diagnosis:	Pleural effusion due to CHF (congestive heart failure)  Goal:	s/p tap, transudate, cont lasix  Secondary Diagnosis:	Urinary retention  Goal:	f/u with Dr Arshad, hold vesicar for now Principal Discharge DX:	Enteritis  Goal:	resolved, completed the course of abx, cipro and flagyl, f/u with Dr Chester.  Colonoscopy as outpatient.  Instructions for follow-up, activity and diet:	as above  Secondary Diagnosis:	Pneumonia  Goal:	left lobe, unknown etiology, 2 more days of levaquin  Secondary Diagnosis:	Afib  Goal:	new onset and paroxismal, started on eliquis and digoxin, metoprolol. f/u with Dr lay,  Instructions for follow-up, activity and diet:	as above  Secondary Diagnosis:	Pleural effusion due to CHF (congestive heart failure)  Goal:	s/p tap, transudate, cont lasix  Secondary Diagnosis:	Urinary retention  Goal:	f/u with Dr Arshad, hold vesicar for now

## 2017-02-15 NOTE — DISCHARGE NOTE ADULT - MEDICATION SUMMARY - MEDICATIONS TO TAKE
I will START or STAY ON the medications listed below when I get home from the hospital:    acetaminophen 325 mg oral tablet  -- 2 tab(s) by mouth every 6 hours, As needed, For Temp greater than 38 C (100.4 F)  -- Indication: For Pain     acetaminophen 325 mg oral tablet  -- 2 tab(s) by mouth every 6 hours, As needed, for pain  -- Indication: For Pain    tamsulosin 0.4 mg oral capsule  -- 1 cap(s) by mouth once a day (at bedtime)  -- Indication: For Urinary retention    digoxin 125 mcg (0.125 mg) oral tablet  -- 1 tab(s) by mouth once a day  -- Indication: For Afib    apixaban 2.5 mg oral tablet  -- 1 tab(s) by mouth 2 times a day  -- Indication: For Afib    PARoxetine 20 mg oral tablet  -- 1 tab(s) by mouth once a day  -- Indication: For Depression, unspecified depression type    metoprolol tartrate 50 mg oral tablet  -- 1 tab(s) by mouth 3 times a day  -- Indication: For Afib    furosemide 20 mg oral tablet  -- 1 tab(s) by mouth once a day  -- Indication: For Pleural effusion due to CHF (congestive heart failure)    potassium chloride 10 mEq oral capsule, extended release  -- 1 cap(s) by mouth once a day  adjust dosage based on potasium level.   -- Indication: For supplemnetory for lasix    melatonin 10 mg oral tablet, disintegrating  -- 1 tab(s) by mouth once a day (at bedtime)  -- Indication: For Home meds    lactobacillus acidophilus oral capsule  -- 1  by mouth 2 times a day (with meals)  -- Indication: For Cdiff ppx    levoFLOXacin 500 mg oral tablet  -- 1 tab(s) by mouth every 24 hours  -- Indication: For Pneumonia    nicotine 21 mg/24 hr transdermal film, extended release  -- 1  by transdermal patch once a day  -- Indication: For tobacco addiction

## 2017-02-15 NOTE — DISCHARGE NOTE ADULT - HOSPITAL COURSE
87F PMHx depression, over active bladder, COPD (not on maintenance meds), HTN (no longer needing meds), alternating constipation and diarrhea presenting with weakness and abdominal pain.  History obtained from daughter and patient. Pt had not been feeling herself for the past 3 days, more tired and weak with a hoarse voice c/o crampy abdominal and back pain. Pt had multiple episodes of diarrhea day prior to admission, too numerous to count, pt was incontinent of stool, 1 episode of blood in stool, but all others brown without blood. Pt took unknown anti-diarrheal medication x3, diarrhea stopped. When patient awoke this morning she was too weak to get out of bed so daughter took her to the ER. Admits to decreased appetite over the past few days. Denies fever, new cough, sore throat, nausea, vomiting, sick contacts, SOB or chest pain.     In ED T 101F, WBC 18, 20% bands. UA +leuk esterase, +nitrite, many bacteria. FOBT+.    CXR: Asymmetric increased density seen throughout the left hemithorax compatible with left-sided airspace disease, possibly PNA.   CT A/P: Enteritis infectious vs inflammatory, Dependent B/L LL airspace disease with superimposed nodular left lower lobe opacities, possible PNA. Possible infectious or inflammatory cystitis. Nodular hepatic surface requiring further evaluation for cirrhosis. Severe compression deformity of L1 vertebral body, age indeterminate.   Multilevel disc osteophyte complexes.   CT Head: negative for acute bleed  Received 1 dose of Rocephin.     Pt admitted for weakness, with colitis and ?PNA and ?UTI.       02/08 consulted priscilla  87F PMHx depression, over active bladder, COPD (not on maintenance meds), HTN (no longer needing meds), alternating constipation and diarrhea presenting with weakness and abdominal pain.  History obtained from daughter and patient. Pt had not been feeling herself for the past 3 days, more tired and weak with a hoarse voice c/o crampy abdominal and back pain. Pt had multiple episodes of diarrhea day prior to admission, too numerous to count, pt was incontinent of stool, 1 episode of blood in stool, but all others brown without blood. Pt took unknown anti-diarrheal medication x3, diarrhea stopped. When patient awoke this morning she was too weak to get out of bed so daughter took her to the ER. Admits to decreased appetite over the past few days. Denies fever, new cough, sore throat, nausea, vomiting, sick contacts, SOB or chest pain.     In ED T 101F, WBC 18, 20% bands. UA +leuk esterase, +nitrite, many bacteria. FOBT+.    CXR: Asymmetric increased density seen throughout the left hemithorax compatible with left-sided airspace disease, possibly PNA.   CT A/P: Enteritis infectious vs inflammatory,       02/08 continue levaquin,  repeat H/H, consult GI and per pt 's PCP no EGD or colonosopy. D/C lovenox 2/2 GI bleeding, continue with protonix    02/09 UCX + for  ecoli , continue with cipro and flagyl. consult Neuro( ) for resting tremer and possible demetia. continue to have melena but no bright red GI bleeding. check anemia panel.   rapid afib new onset at 142 this afternoon, ECG showed a flutter, Remote tele added and showed rapid afib.  Lactate wnl, mag nesium wnl, Tyroid panel pending.  added lopressor 25mg Q8 hrs, still not controled, troponin elevated at 1.62, on cardizem drip and transfer to tele . dr. lay notified, rate controlled with po Digoxin and metoprolol, she was started on Eliquis her CHADS is 2 and CHADS2 VAS is 3, Dr Gresham recommneded Eliquis, discussed with her and family , expalined the risk and benefits of eliquis versus coumadin, the family and her seld understood the risk of bleeding but requested Eliquis.   Hypoxic, Acute respiratory failure due to bilateral effusion, mild diastolic CHF and left lobe pneumonia, confirmed by CT of chest, Dr Guerrier consulted, CTA done and ruled out PE, DVT, thoracentesis done andshowed transudate fluid  secondary CHF, echo showed mild diastolic CHF, no valvular dx. started on low dose lasix.   Urinary retention : straight cath insertion failed, schuler inserted, started on flomax, consulted Dr Arshad.   Altered MS, due to metabolic encephalopathy due to UTI and pneumonia, resolved.   UTI, resolved and treated with Abx  Entritis, consulted GI, resolved and course of abx therapy with cipro and flagyl completed.  HCAP, unknown etiology, cont course of levaquin and flagyl for 10 days total.   please update hospital course on discharge day. 87F PMHx depression, over active bladder, COPD (not on maintenance meds), HTN (no longer needing meds), alternating constipation and diarrhea presenting with weakness and abdominal pain.  History obtained from daughter and patient. Pt had not been feeling herself for the past 3 days, more tired and weak with a hoarse voice c/o crampy abdominal and back pain. Pt had multiple episodes of diarrhea day prior to admission, too numerous to count, pt was incontinent of stool, 1 episode of blood in stool, but all others brown without blood. Pt took unknown anti-diarrheal medication x3, diarrhea stopped. When patient awoke this morning she was too weak to get out of bed so daughter took her to the ER. Admits to decreased appetite over the past few days. Denies fever, new cough, sore throat, nausea, vomiting, sick contacts, SOB or chest pain.     In ED T 101F, WBC 18, 20% bands. UA +leuk esterase, +nitrite, many bacteria. FOBT+.    CXR: Asymmetric increased density seen throughout the left hemithorax compatible with left-sided airspace disease, possibly PNA.   CT A/P: Enteritis infectious vs inflammatory, Dependent B/L LL airspace disease with superimposed nodular left lower lobe opacities, possible PNA. Possible infectious or inflammatory cystitis. Nodular hepatic surface requiring further evaluation for cirrhosis. Severe compression deformity of L1 vertebral body, age indeterminate.   Multilevel disc osteophyte complexes.   CT Head: negative for acute bleed  Received 1 dose of Rocephin.     Pt admitted for weakness, with colitis and ?PNA and ?UTI.       02/08 consulted priscilla  87F PMHx depression, over active bladder, COPD (not on maintenance meds), HTN (no longer needing meds), alternating constipation and diarrhea presenting with weakness and abdominal pain.  History obtained from daughter and patient. Pt had not been feeling herself for the past 3 days, more tired and weak with a hoarse voice c/o crampy abdominal and back pain. Pt had multiple episodes of diarrhea day prior to admission, too numerous to count, pt was incontinent of stool, 1 episode of blood in stool, but all others brown without blood. Pt took unknown anti-diarrheal medication x3, diarrhea stopped. When patient awoke this morning she was too weak to get out of bed so daughter took her to the ER. Admits to decreased appetite over the past few days. Denies fever, new cough, sore throat, nausea, vomiting, sick contacts, SOB or chest pain.     In ED T 101F, WBC 18, 20% bands. UA +leuk esterase, +nitrite, many bacteria. FOBT+.    CXR: Asymmetric increased density seen throughout the left hemithorax compatible with left-sided airspace disease, possibly PNA.   CT A/P: Enteritis infectious vs inflammatory,       02/08 continue levaquin,  repeat H/H, consult GI and per pt 's PCP no EGD or colonosopy. D/C lovenox 2/2 GI bleeding, continue with protonix    02/09 UCX + for  ecoli , continue with cipro and flagyl. consult Neuro( ) for resting tremer and possible demetia. continue to have melena but no bright red GI bleeding. check anemia panel.   rapid afib new onset at 142 this afternoon, ECG showed a flutter, Remote tele added and showed rapid afib.  Lactate wnl, mag nesium wnl, Tyroid panel pending.  added lopressor 25mg Q8 hrs, still not controled, troponin elevated at 1.62, on cardizem drip and transfer to tele . dr. lay notified, rate controlled with po Digoxin and metoprolol, she was started on Eliquis her CHADS is 2 and CHADS2 VAS is 3, Dr Gresham recommneded Eliquis, discussed with her and family , expalined the risk and benefits of eliquis versus coumadin, the family and her seld understood the risk of bleeding but requested Eliquis.   Hypoxic, Acute respiratory failure due to bilateral effusion, mild diastolic CHF and left lobe pneumonia, confirmed by CT of chest, Dr Guerrier consulted, CTA done and ruled out PE, DVT, thoracentesis done andshowed transudate fluid  secondary CHF, echo showed mild diastolic CHF, no valvular dx. started on low dose lasix.   Urinary retention : straight cath insertion failed, schuler inserted, started on flomax, consulted Dr Arshad. f/u with him as outpatient and discharge with schuler.   Altered MS, due to metabolic encephalopathy due to UTI and pneumonia, resolved f/u with neuro as outpatient.   UTI, resolved and treated with Abx  Entritis, consulted GI, resolved and course of abx therapy with cipro and flagyl completed.  HCAP, unknown etiology, cont course of levaquin and flagyl for 10 days total. 87F PMHx depression, over active bladder, COPD (not on maintenance meds), HTN (no longer needing meds), alternating constipation and diarrhea presenting with weakness and abdominal pain.  History obtained from daughter and patient. Pt had not been feeling herself for the past 3 days, more tired and weak with a hoarse voice c/o crampy abdominal and back pain. Pt had multiple episodes of diarrhea day prior to admission, too numerous to count, pt was incontinent of stool, 1 episode of blood in stool, but all others brown without blood. Pt took unknown anti-diarrheal medication x3, diarrhea stopped. When patient awoke this morning she was too weak to get out of bed so daughter took her to the ER. Admits to decreased appetite over the past few days. Denies fever, new cough, sore throat, nausea, vomiting, sick contacts, SOB or chest pain.     In ED T 101F, WBC 18, 20% bands. UA +leuk esterase, +nitrite, many bacteria. FOBT+.    CXR: Asymmetric increased density seen throughout the left hemithorax compatible with left-sided airspace disease, possibly PNA.   CT A/P: Enteritis infectious vs inflammatory, Dependent B/L LL airspace disease with superimposed nodular left lower lobe opacities, possible PNA. Possible infectious or inflammatory cystitis. Nodular hepatic surface requiring further evaluation for cirrhosis. Severe compression deformity of L1 vertebral body, age indeterminate.   Multilevel disc osteophyte complexes.   CT Head: negative for acute bleed  Received 1 dose of Rocephin.     Pt admitted for weakness, with colitis and ?PNA and ?UTI.       02/08 consulted priscilla  87F PMHx depression, over active bladder, COPD (not on maintenance meds), HTN (no longer needing meds), alternating constipation and diarrhea presenting with weakness and abdominal pain.  History obtained from daughter and patient. Pt had not been feeling herself for the past 3 days, more tired and weak with a hoarse voice c/o crampy abdominal and back pain. Pt had multiple episodes of diarrhea day prior to admission, too numerous to count, pt was incontinent of stool, 1 episode of blood in stool, but all others brown without blood. Pt took unknown anti-diarrheal medication x3, diarrhea stopped. When patient awoke this morning she was too weak to get out of bed so daughter took her to the ER. Admits to decreased appetite over the past few days. Denies fever, new cough, sore throat, nausea, vomiting, sick contacts, SOB or chest pain.     In ED T 101F, WBC 18, 20% bands. UA +leuk esterase, +nitrite, many bacteria. FOBT+.    CXR: Asymmetric increased density seen throughout the left hemithorax compatible with left-sided airspace disease, possibly PNA.   CT A/P: Enteritis infectious vs inflammatory,       02/08 continue levaquin,  repeat H/H, consult GI and per pt 's PCP no EGD or colonosopy. D/C lovenox 2/2 GI bleeding, continue with protonix    02/09 UCX + for  ecoli , continue with cipro and flagyl. consult Neuro( ) for resting tremer and possible demetia. continue to have melena but no bright red GI bleeding. check anemia panel.   rapid afib new onset at 142 this afternoon, ECG showed a flutter, Remote tele added and showed rapid afib.  Lactate wnl, mag nesium wnl, Tyroid panel pending.  added lopressor 25mg Q8 hrs, still not controled, troponin elevated at 1.62, on cardizem drip and transfer to tele . dr. lay notified, rate controlled with po Digoxin and metoprolol, she was started on Eliquis her CHADS is 2 and CHADS2 VAS is 3, Dr Gresham recommneded Eliquis, discussed with her and family , expalined the risk and benefits of eliquis versus coumadin, the family and her seld understood the risk of bleeding but requested Eliquis.   Hypoxic, Acute respiratory failure due to bilateral effusion, mild diastolic CHF and left lobe pneumonia, confirmed by CT of chest, Dr Guerrier consulted, CTA done and ruled out PE, DVT, thoracentesis done andshowed transudate fluid  secondary CHF, echo showed mild diastolic CHF, no valvular dx. started on low dose lasix.   Urinary retention : straight cath insertion failed, schuler inserted, started on flomax, consulted Dr Arshad. f/u with him as outpatient and discharge with schuler.   Altered MS, due to metabolic encephalopathy due to UTI and pneumonia, resolved f/u with neuro as outpatient.   UTI, resolved and treated with Abx  Entritis, consulted GI, resolved and course of abx therapy with cipro and flagyl completed.  HCAP, unknown etiology, cont course of levaquin for 7 days.  2 more days of Levaquin.  Flagyl completed.

## 2017-02-15 NOTE — DISCHARGE NOTE ADULT - MEDICATION SUMMARY - MEDICATIONS TO STOP TAKING
I will STOP taking the medications listed below when I get home from the hospital:    VESIcare 5 mg oral tablet  -- 1 tab(s) by mouth once a day    Aleve Gelcap 220 mg oral tablet  -- 1 tab(s) by mouth every 8 hours    Advil PM Liqui-Gels 25 mg-200 mg oral capsule  -- 2 cap(s) by mouth once a day (at bedtime)

## 2017-02-15 NOTE — DISCHARGE NOTE ADULT - OTHER SIGNIFICANT FINDINGS
1. Normal left ventricular size, wall thickness, and global systolic   function  2. Mitral annular calcification with mild mitral regurgitation and left   atrial enlargement  3. Aortic sclerosis with moderate aortic regurgitation  4. Bilateral pleural effusions    CTA: Negative for pulmonary emboli.  Emphysema with interstitial fibrotic and bronchiectatic changes as   described.  Small patchy multifocal consolidation left lung presumptive pneumonia.  Bilateral layering pleural effusions with dependent atelectasis.

## 2017-02-16 PROCEDURE — 99232 SBSQ HOSP IP/OBS MODERATE 35: CPT

## 2017-02-16 RX ORDER — TAMSULOSIN HYDROCHLORIDE 0.4 MG/1
0.4 CAPSULE ORAL AT BEDTIME
Qty: 0 | Refills: 0 | Status: DISCONTINUED | OUTPATIENT
Start: 2017-02-16 | End: 2017-02-17

## 2017-02-16 RX ORDER — DIGOXIN 250 MCG
0.12 TABLET ORAL DAILY
Qty: 0 | Refills: 0 | Status: DISCONTINUED | OUTPATIENT
Start: 2017-02-16 | End: 2017-02-17

## 2017-02-16 RX ADMIN — Medication 0.12 MILLIGRAM(S): at 05:28

## 2017-02-16 RX ADMIN — APIXABAN 2.5 MILLIGRAM(S): 2.5 TABLET, FILM COATED ORAL at 17:13

## 2017-02-16 RX ADMIN — Medication 5 MILLIGRAM(S): at 05:28

## 2017-02-16 RX ADMIN — TAMSULOSIN HYDROCHLORIDE 0.4 MILLIGRAM(S): 0.4 CAPSULE ORAL at 22:07

## 2017-02-16 RX ADMIN — Medication 500 MILLIGRAM(S): at 05:28

## 2017-02-16 RX ADMIN — Medication 50 MILLIGRAM(S): at 22:06

## 2017-02-16 RX ADMIN — Medication 1 TABLET(S): at 08:20

## 2017-02-16 RX ADMIN — PANTOPRAZOLE SODIUM 40 MILLIGRAM(S): 20 TABLET, DELAYED RELEASE ORAL at 17:16

## 2017-02-16 RX ADMIN — Medication 500 MILLIGRAM(S): at 14:53

## 2017-02-16 RX ADMIN — APIXABAN 2.5 MILLIGRAM(S): 2.5 TABLET, FILM COATED ORAL at 05:28

## 2017-02-16 RX ADMIN — Medication 50 MILLIGRAM(S): at 14:53

## 2017-02-16 RX ADMIN — Medication 1 TABLET(S): at 17:13

## 2017-02-16 RX ADMIN — Medication 20 MILLIGRAM(S): at 12:30

## 2017-02-16 RX ADMIN — Medication 5 MILLIGRAM(S): at 17:16

## 2017-02-16 RX ADMIN — PANTOPRAZOLE SODIUM 40 MILLIGRAM(S): 20 TABLET, DELAYED RELEASE ORAL at 05:27

## 2017-02-16 RX ADMIN — Medication 1 PATCH: at 12:30

## 2017-02-16 RX ADMIN — Medication 1 PATCH: at 12:36

## 2017-02-16 RX ADMIN — Medication 50 MILLIGRAM(S): at 05:28

## 2017-02-16 RX ADMIN — Medication 500 MILLIGRAM(S): at 22:06

## 2017-02-16 RX ADMIN — Medication 20 MILLIGRAM(S): at 05:28

## 2017-02-16 RX ADMIN — Medication 1 TABLET(S): at 12:30

## 2017-02-17 VITALS — DIASTOLIC BLOOD PRESSURE: 62 MMHG | SYSTOLIC BLOOD PRESSURE: 106 MMHG

## 2017-02-17 LAB
ANION GAP SERPL CALC-SCNC: 7 MMOL/L — SIGNIFICANT CHANGE UP (ref 5–17)
BUN SERPL-MCNC: 21 MG/DL — SIGNIFICANT CHANGE UP (ref 7–23)
CALCIUM SERPL-MCNC: 9 MG/DL — SIGNIFICANT CHANGE UP (ref 8.5–10.1)
CHLORIDE SERPL-SCNC: 106 MMOL/L — SIGNIFICANT CHANGE UP (ref 96–108)
CO2 SERPL-SCNC: 27 MMOL/L — SIGNIFICANT CHANGE UP (ref 22–31)
CREAT SERPL-MCNC: 0.79 MG/DL — SIGNIFICANT CHANGE UP (ref 0.5–1.3)
GLUCOSE SERPL-MCNC: 114 MG/DL — HIGH (ref 70–99)
HCT VFR BLD CALC: 41.8 % — SIGNIFICANT CHANGE UP (ref 34.5–45)
HGB BLD-MCNC: 13.1 G/DL — SIGNIFICANT CHANGE UP (ref 11.5–15.5)
MCHC RBC-ENTMCNC: 31.3 PG — SIGNIFICANT CHANGE UP (ref 27–34)
MCHC RBC-ENTMCNC: 31.4 GM/DL — LOW (ref 32–36)
MCV RBC AUTO: 99.8 FL — SIGNIFICANT CHANGE UP (ref 80–100)
PLATELET # BLD AUTO: 400 K/UL — SIGNIFICANT CHANGE UP (ref 150–400)
POTASSIUM SERPL-MCNC: 4.3 MMOL/L — SIGNIFICANT CHANGE UP (ref 3.5–5.3)
POTASSIUM SERPL-SCNC: 4.3 MMOL/L — SIGNIFICANT CHANGE UP (ref 3.5–5.3)
RBC # BLD: 4.19 M/UL — SIGNIFICANT CHANGE UP (ref 3.8–5.2)
RBC # FLD: 14.1 % — SIGNIFICANT CHANGE UP (ref 10.3–14.5)
SODIUM SERPL-SCNC: 140 MMOL/L — SIGNIFICANT CHANGE UP (ref 135–145)
WBC # BLD: 14.4 K/UL — HIGH (ref 3.8–10.5)
WBC # FLD AUTO: 14.4 K/UL — HIGH (ref 3.8–10.5)

## 2017-02-17 PROCEDURE — 96365 THER/PROPH/DIAG IV INF INIT: CPT

## 2017-02-17 PROCEDURE — 99239 HOSP IP/OBS DSCHRG MGMT >30: CPT

## 2017-02-17 PROCEDURE — 88108 CYTOPATH CONCENTRATE TECH: CPT

## 2017-02-17 PROCEDURE — 71250 CT THORAX DX C-: CPT

## 2017-02-17 PROCEDURE — 70450 CT HEAD/BRAIN W/O DYE: CPT

## 2017-02-17 PROCEDURE — 32555 ASPIRATE PLEURA W/ IMAGING: CPT

## 2017-02-17 PROCEDURE — 83605 ASSAY OF LACTIC ACID: CPT

## 2017-02-17 PROCEDURE — 87177 OVA AND PARASITES SMEARS: CPT

## 2017-02-17 PROCEDURE — 84443 ASSAY THYROID STIM HORMONE: CPT

## 2017-02-17 PROCEDURE — 87015 SPECIMEN INFECT AGNT CONCNTJ: CPT

## 2017-02-17 PROCEDURE — 99285 EMERGENCY DEPT VISIT HI MDM: CPT | Mod: 25

## 2017-02-17 PROCEDURE — 82728 ASSAY OF FERRITIN: CPT

## 2017-02-17 PROCEDURE — 85610 PROTHROMBIN TIME: CPT

## 2017-02-17 PROCEDURE — 83735 ASSAY OF MAGNESIUM: CPT

## 2017-02-17 PROCEDURE — 82746 ASSAY OF FOLIC ACID SERUM: CPT

## 2017-02-17 PROCEDURE — 71045 X-RAY EXAM CHEST 1 VIEW: CPT

## 2017-02-17 PROCEDURE — 85730 THROMBOPLASTIN TIME PARTIAL: CPT

## 2017-02-17 PROCEDURE — 82550 ASSAY OF CK (CPK): CPT

## 2017-02-17 PROCEDURE — 83986 ASSAY PH BODY FLUID NOS: CPT

## 2017-02-17 PROCEDURE — 71275 CT ANGIOGRAPHY CHEST: CPT

## 2017-02-17 PROCEDURE — 83550 IRON BINDING TEST: CPT

## 2017-02-17 PROCEDURE — 87075 CULTR BACTERIA EXCEPT BLOOD: CPT

## 2017-02-17 PROCEDURE — 97161 PT EVAL LOW COMPLEX 20 MIN: CPT

## 2017-02-17 PROCEDURE — 87206 SMEAR FLUORESCENT/ACID STAI: CPT

## 2017-02-17 PROCEDURE — 84157 ASSAY OF PROTEIN OTHER: CPT

## 2017-02-17 PROCEDURE — 87205 SMEAR GRAM STAIN: CPT

## 2017-02-17 PROCEDURE — 85027 COMPLETE CBC AUTOMATED: CPT

## 2017-02-17 PROCEDURE — 96372 THER/PROPH/DIAG INJ SC/IM: CPT | Mod: XU

## 2017-02-17 PROCEDURE — 87086 URINE CULTURE/COLONY COUNT: CPT

## 2017-02-17 PROCEDURE — 82553 CREATINE MB FRACTION: CPT

## 2017-02-17 PROCEDURE — 87070 CULTURE OTHR SPECIMN AEROBIC: CPT

## 2017-02-17 PROCEDURE — 93005 ELECTROCARDIOGRAM TRACING: CPT

## 2017-02-17 PROCEDURE — 87102 FUNGUS ISOLATION CULTURE: CPT

## 2017-02-17 PROCEDURE — 87449 NOS EACH ORGANISM AG IA: CPT

## 2017-02-17 PROCEDURE — 96367 TX/PROPH/DG ADDL SEQ IV INF: CPT

## 2017-02-17 PROCEDURE — 93970 EXTREMITY STUDY: CPT

## 2017-02-17 PROCEDURE — 82272 OCCULT BLD FECES 1-3 TESTS: CPT

## 2017-02-17 PROCEDURE — 80048 BASIC METABOLIC PNL TOTAL CA: CPT

## 2017-02-17 PROCEDURE — 83615 LACTATE (LD) (LDH) ENZYME: CPT

## 2017-02-17 PROCEDURE — 84145 PROCALCITONIN (PCT): CPT

## 2017-02-17 PROCEDURE — 89051 BODY FLUID CELL COUNT: CPT

## 2017-02-17 PROCEDURE — 87045 FECES CULTURE AEROBIC BACT: CPT

## 2017-02-17 PROCEDURE — 85018 HEMOGLOBIN: CPT

## 2017-02-17 PROCEDURE — 87116 MYCOBACTERIA CULTURE: CPT

## 2017-02-17 PROCEDURE — 84480 ASSAY TRIIODOTHYRONINE (T3): CPT

## 2017-02-17 PROCEDURE — 84100 ASSAY OF PHOSPHORUS: CPT

## 2017-02-17 PROCEDURE — 84436 ASSAY OF TOTAL THYROXINE: CPT

## 2017-02-17 PROCEDURE — 88305 TISSUE EXAM BY PATHOLOGIST: CPT

## 2017-02-17 PROCEDURE — 82607 VITAMIN B-12: CPT

## 2017-02-17 PROCEDURE — 81001 URINALYSIS AUTO W/SCOPE: CPT

## 2017-02-17 PROCEDURE — 99233 SBSQ HOSP IP/OBS HIGH 50: CPT

## 2017-02-17 PROCEDURE — 93306 TTE W/DOPPLER COMPLETE: CPT

## 2017-02-17 PROCEDURE — 74177 CT ABD & PELVIS W/CONTRAST: CPT

## 2017-02-17 PROCEDURE — 82042 OTHER SOURCE ALBUMIN QUAN EA: CPT

## 2017-02-17 PROCEDURE — 84484 ASSAY OF TROPONIN QUANT: CPT

## 2017-02-17 PROCEDURE — 87186 SC STD MICRODIL/AGAR DIL: CPT

## 2017-02-17 PROCEDURE — 80053 COMPREHEN METABOLIC PANEL: CPT

## 2017-02-17 PROCEDURE — 87040 BLOOD CULTURE FOR BACTERIA: CPT

## 2017-02-17 PROCEDURE — 87046 STOOL CULTR AEROBIC BACT EA: CPT

## 2017-02-17 PROCEDURE — 82945 GLUCOSE OTHER FLUID: CPT

## 2017-02-17 PROCEDURE — 83690 ASSAY OF LIPASE: CPT

## 2017-02-17 RX ORDER — TAMSULOSIN HYDROCHLORIDE 0.4 MG/1
1 CAPSULE ORAL
Qty: 0 | Refills: 0 | COMMUNITY
Start: 2017-02-17

## 2017-02-17 RX ORDER — SOLIFENACIN SUCCINATE 10 MG/1
1 TABLET ORAL
Qty: 0 | Refills: 0 | COMMUNITY

## 2017-02-17 RX ORDER — METRONIDAZOLE 500 MG
500 TABLET ORAL EVERY 8 HOURS
Qty: 0 | Refills: 0 | Status: DISCONTINUED | OUTPATIENT
Start: 2017-02-17 | End: 2017-02-17

## 2017-02-17 RX ORDER — POTASSIUM CHLORIDE 20 MEQ
1 PACKET (EA) ORAL
Qty: 0 | Refills: 0 | COMMUNITY

## 2017-02-17 RX ADMIN — PANTOPRAZOLE SODIUM 40 MILLIGRAM(S): 20 TABLET, DELAYED RELEASE ORAL at 05:18

## 2017-02-17 RX ADMIN — Medication 1 PATCH: at 13:54

## 2017-02-17 RX ADMIN — Medication 1 TABLET(S): at 09:25

## 2017-02-17 RX ADMIN — APIXABAN 2.5 MILLIGRAM(S): 2.5 TABLET, FILM COATED ORAL at 05:18

## 2017-02-17 RX ADMIN — Medication 1 TABLET(S): at 13:53

## 2017-02-17 RX ADMIN — Medication 20 MILLIGRAM(S): at 13:53

## 2017-02-17 RX ADMIN — Medication 1 TABLET(S): at 17:51

## 2017-02-17 RX ADMIN — Medication 5 MILLIGRAM(S): at 17:51

## 2017-02-17 RX ADMIN — APIXABAN 2.5 MILLIGRAM(S): 2.5 TABLET, FILM COATED ORAL at 17:51

## 2017-02-17 RX ADMIN — Medication 5 MILLIGRAM(S): at 05:21

## 2017-02-17 RX ADMIN — PANTOPRAZOLE SODIUM 40 MILLIGRAM(S): 20 TABLET, DELAYED RELEASE ORAL at 17:51

## 2017-02-17 RX ADMIN — Medication 1 PATCH: at 13:55

## 2017-02-17 RX ADMIN — Medication 500 MILLIGRAM(S): at 05:18

## 2017-02-17 RX ADMIN — Medication 50 MILLIGRAM(S): at 13:58

## 2017-02-17 RX ADMIN — Medication 0.12 MILLIGRAM(S): at 05:18

## 2017-02-17 RX ADMIN — Medication 500 MILLIGRAM(S): at 13:53

## 2017-02-18 LAB
CULTURE RESULTS: SIGNIFICANT CHANGE UP
SPECIMEN SOURCE: SIGNIFICANT CHANGE UP

## 2017-02-19 PROBLEM — Z00.00 ENCOUNTER FOR PREVENTIVE HEALTH EXAMINATION: Status: ACTIVE | Noted: 2017-02-19

## 2017-02-21 DIAGNOSIS — J44.9 CHRONIC OBSTRUCTIVE PULMONARY DISEASE, UNSPECIFIED: ICD-10-CM

## 2017-02-21 DIAGNOSIS — K92.1 MELENA: ICD-10-CM

## 2017-02-21 DIAGNOSIS — N28.1 CYST OF KIDNEY, ACQUIRED: ICD-10-CM

## 2017-02-21 DIAGNOSIS — R49.0 DYSPHONIA: ICD-10-CM

## 2017-02-21 DIAGNOSIS — I48.0 PAROXYSMAL ATRIAL FIBRILLATION: ICD-10-CM

## 2017-02-21 DIAGNOSIS — J96.01 ACUTE RESPIRATORY FAILURE WITH HYPOXIA: ICD-10-CM

## 2017-02-21 DIAGNOSIS — G93.41 METABOLIC ENCEPHALOPATHY: ICD-10-CM

## 2017-02-21 DIAGNOSIS — J18.9 PNEUMONIA, UNSPECIFIED ORGANISM: ICD-10-CM

## 2017-02-21 DIAGNOSIS — I24.8 OTHER FORMS OF ACUTE ISCHEMIC HEART DISEASE: ICD-10-CM

## 2017-02-21 DIAGNOSIS — E87.6 HYPOKALEMIA: ICD-10-CM

## 2017-02-21 DIAGNOSIS — Z66 DO NOT RESUSCITATE: ICD-10-CM

## 2017-02-21 DIAGNOSIS — I70.0 ATHEROSCLEROSIS OF AORTA: ICD-10-CM

## 2017-02-21 DIAGNOSIS — N39.0 URINARY TRACT INFECTION, SITE NOT SPECIFIED: ICD-10-CM

## 2017-02-21 DIAGNOSIS — M19.90 UNSPECIFIED OSTEOARTHRITIS, UNSPECIFIED SITE: ICD-10-CM

## 2017-02-21 DIAGNOSIS — D72.829 ELEVATED WHITE BLOOD CELL COUNT, UNSPECIFIED: ICD-10-CM

## 2017-02-21 DIAGNOSIS — Z86.73 PERSONAL HISTORY OF TRANSIENT ISCHEMIC ATTACK (TIA), AND CEREBRAL INFARCTION WITHOUT RESIDUAL DEFICITS: ICD-10-CM

## 2017-02-21 DIAGNOSIS — B96.20 UNSPECIFIED ESCHERICHIA COLI [E. COLI] AS THE CAUSE OF DISEASES CLASSIFIED ELSEWHERE: ICD-10-CM

## 2017-02-21 DIAGNOSIS — Z87.891 PERSONAL HISTORY OF NICOTINE DEPENDENCE: ICD-10-CM

## 2017-02-21 DIAGNOSIS — F32.9 MAJOR DEPRESSIVE DISORDER, SINGLE EPISODE, UNSPECIFIED: ICD-10-CM

## 2017-02-21 DIAGNOSIS — I08.0 RHEUMATIC DISORDERS OF BOTH MITRAL AND AORTIC VALVES: ICD-10-CM

## 2017-02-21 DIAGNOSIS — Z79.82 LONG TERM (CURRENT) USE OF ASPIRIN: ICD-10-CM

## 2017-02-21 DIAGNOSIS — J98.11 ATELECTASIS: ICD-10-CM

## 2017-02-21 DIAGNOSIS — F03.90 UNSPECIFIED DEMENTIA, UNSPECIFIED SEVERITY, WITHOUT BEHAVIORAL DISTURBANCE, PSYCHOTIC DISTURBANCE, MOOD DISTURBANCE, AND ANXIETY: ICD-10-CM

## 2017-02-21 DIAGNOSIS — N32.81 OVERACTIVE BLADDER: ICD-10-CM

## 2017-02-21 DIAGNOSIS — K52.9 NONINFECTIVE GASTROENTERITIS AND COLITIS, UNSPECIFIED: ICD-10-CM

## 2017-02-21 DIAGNOSIS — I50.33 ACUTE ON CHRONIC DIASTOLIC (CONGESTIVE) HEART FAILURE: ICD-10-CM

## 2017-02-21 DIAGNOSIS — I11.0 HYPERTENSIVE HEART DISEASE WITH HEART FAILURE: ICD-10-CM

## 2017-02-21 DIAGNOSIS — G25.2 OTHER SPECIFIED FORMS OF TREMOR: ICD-10-CM

## 2017-02-21 DIAGNOSIS — Z91.81 HISTORY OF FALLING: ICD-10-CM

## 2017-02-21 DIAGNOSIS — K59.00 CONSTIPATION, UNSPECIFIED: ICD-10-CM

## 2017-02-27 DIAGNOSIS — D63.8 ANEMIA IN OTHER CHRONIC DISEASES CLASSIFIED ELSEWHERE: ICD-10-CM

## 2017-02-27 DIAGNOSIS — A41.9 SEPSIS, UNSPECIFIED ORGANISM: ICD-10-CM

## 2017-03-15 LAB
CULTURE RESULTS: SIGNIFICANT CHANGE UP
SPECIMEN SOURCE: SIGNIFICANT CHANGE UP

## 2017-03-29 LAB
CULTURE RESULTS: SIGNIFICANT CHANGE UP
SPECIMEN SOURCE: SIGNIFICANT CHANGE UP

## 2019-11-20 NOTE — PHYSICAL THERAPY INITIAL EVALUATION ADULT - ASR WT BEARING STATUS EVAL
Spoke w/ family member, has appt w/you on 12/6 to estab and was also dschgd from [facility] on 11/19    Will wait til then no weight-bearing restrictions

## 2024-01-09 NOTE — PHYSICAL THERAPY INITIAL EVALUATION ADULT - PHYSICAL ASSIST/NONPHYSICAL ASSIST: BED TO CHAIR, REHAB EVAL
Quality 226: Preventive Care And Screening: Tobacco Use: Screening And Cessation Intervention: Patient screened for tobacco use and is an ex/non-smoker Quality 431: Preventive Care And Screening: Unhealthy Alcohol Use - Screening: Patient not identified as an unhealthy alcohol user when screened for unhealthy alcohol use using a systematic screening method Detail Level: Detailed verbal cues/1 person assist

## 2024-08-22 NOTE — ED ADULT NURSE REASSESSMENT NOTE - COMFORT CARE
